# Patient Record
Sex: MALE | Race: WHITE | NOT HISPANIC OR LATINO | Employment: OTHER | URBAN - METROPOLITAN AREA
[De-identification: names, ages, dates, MRNs, and addresses within clinical notes are randomized per-mention and may not be internally consistent; named-entity substitution may affect disease eponyms.]

---

## 2017-07-10 ENCOUNTER — HISTORICAL (OUTPATIENT)
Dept: LAB | Facility: HOSPITAL | Age: 72
End: 2017-07-10

## 2017-07-12 LAB — FINAL CULTURE: NORMAL

## 2018-02-26 ENCOUNTER — HISTORICAL (OUTPATIENT)
Dept: LAB | Facility: HOSPITAL | Age: 73
End: 2018-02-26

## 2018-02-26 LAB — GROUP & RH: NORMAL

## 2018-05-23 ENCOUNTER — HISTORICAL (OUTPATIENT)
Dept: ADMINISTRATIVE | Facility: HOSPITAL | Age: 73
End: 2018-05-23

## 2018-05-23 LAB
ABS NEUT (OLG): 6.4
APPEARANCE, UA: CLEAR
BACTERIA #/AREA URNS AUTO: ABNORMAL /HPF
BILIRUB UR QL STRIP: NEGATIVE MG/DL
COLOR UR: YELLOW
ERYTHROCYTE [DISTWIDTH] IN BLOOD BY AUTOMATED COUNT: 12.8 % (ref 11.5–17)
GLUCOSE (UA): NEGATIVE MG/DL
HCT VFR BLD AUTO: 40.8 % (ref 42–52)
HGB BLD-MCNC: 13.4 GM/DL (ref 14–18)
HGB UR QL STRIP: NEGATIVE UNIT/L
KETONES UR QL STRIP: NEGATIVE MG/DL
LEUKOCYTE ESTERASE UR QL STRIP: ABNORMAL UNIT/L
LYMPHOCYTES # BLD AUTO: 1.8 X10(3)/MCL (ref 0.6–3.4)
LYMPHOCYTES NFR BLD AUTO: 19.8 % (ref 13–40)
MCH RBC QN AUTO: 31.2 PG (ref 27–31.2)
MCHC RBC AUTO-ENTMCNC: 33 GM/DL (ref 32–36)
MCV RBC AUTO: 95 FL (ref 80–94)
MONOCYTES # BLD AUTO: 0.9 X10(3)/MCL (ref 0–1.8)
MONOCYTES NFR BLD AUTO: 9.4 % (ref 0.1–24)
NEUTROPHILS NFR BLD AUTO: 70.8 % (ref 47–80)
NITRITE UR QL STRIP.AUTO: NEGATIVE
PH UR STRIP: 6 [PH]
PLATELET # BLD AUTO: 234 X10(3)/MCL (ref 130–400)
PMV BLD AUTO: 8.2 FL
PROT UR QL STRIP: NEGATIVE MG/DL
RBC # BLD AUTO: 4.29 X10(6)/MCL (ref 4.7–6.1)
RBC #/AREA URNS HPF: ABNORMAL /HPF
SP GR UR STRIP: >1.03
SQUAMOUS EPITHELIAL, UA: ABNORMAL /LPF
UROBILINOGEN UR STRIP-ACNC: 0.2 MG/DL
WBC # SPEC AUTO: 9.1 X10(3)/MCL (ref 4.5–11.5)
WBC #/AREA URNS AUTO: ABNORMAL /[HPF]

## 2018-07-29 ENCOUNTER — HOSPITAL ENCOUNTER (OUTPATIENT)
Dept: MEDSURG UNIT | Facility: HOSPITAL | Age: 73
End: 2018-07-31
Attending: SURGERY | Admitting: SURGERY

## 2018-07-29 LAB
ABS NEUT (OLG): 10.77 X10(3)/MCL (ref 2.1–9.2)
ALBUMIN SERPL-MCNC: 3.7 GM/DL (ref 3.4–5)
ALBUMIN/GLOB SERPL: 1.1 RATIO (ref 1.1–2)
ALP SERPL-CCNC: 66 UNIT/L (ref 50–136)
ALT SERPL-CCNC: 20 UNIT/L (ref 12–78)
APPEARANCE, UA: CLEAR
APPEARANCE, UA: NORMAL
AST SERPL-CCNC: 9 UNIT/L (ref 15–37)
BACTERIA SPEC CULT: ABNORMAL /HPF
BACTERIA SPEC CULT: NORMAL /HPF
BASOPHILS # BLD AUTO: 0 X10(3)/MCL (ref 0–0.2)
BASOPHILS NFR BLD AUTO: 0 %
BILIRUB SERPL-MCNC: 0.6 MG/DL (ref 0.2–1)
BILIRUB UR QL STRIP: ABNORMAL
BILIRUB UR QL STRIP: NORMAL
BILIRUBIN DIRECT+TOT PNL SERPL-MCNC: 0.2 MG/DL (ref 0–0.5)
BILIRUBIN DIRECT+TOT PNL SERPL-MCNC: 0.4 MG/DL (ref 0–0.8)
BUN SERPL-MCNC: 26 MG/DL (ref 7–18)
CALCIUM SERPL-MCNC: 8.9 MG/DL (ref 8.5–10.1)
CHLORIDE SERPL-SCNC: 103 MMOL/L (ref 98–107)
CO2 SERPL-SCNC: 31 MMOL/L (ref 21–32)
COLOR UR: ABNORMAL
COLOR UR: NORMAL
CREAT SERPL-MCNC: 1.42 MG/DL (ref 0.7–1.3)
EOSINOPHIL # BLD AUTO: 0.1 X10(3)/MCL (ref 0–0.9)
EOSINOPHIL NFR BLD AUTO: 1 %
ERYTHROCYTE [DISTWIDTH] IN BLOOD BY AUTOMATED COUNT: 12.7 % (ref 11.5–17)
GLOBULIN SER-MCNC: 3.4 GM/DL (ref 2.4–3.5)
GLUCOSE (UA): NEGATIVE
GLUCOSE (UA): NORMAL
GLUCOSE SERPL-MCNC: 116 MG/DL (ref 74–106)
HCT VFR BLD AUTO: 43 % (ref 42–52)
HGB BLD-MCNC: 14 GM/DL (ref 14–18)
HGB UR QL STRIP: NEGATIVE
HGB UR QL STRIP: NORMAL
KETONES UR QL STRIP: ABNORMAL
KETONES UR QL STRIP: NORMAL
LEUKOCYTE ESTERASE UR QL STRIP: ABNORMAL
LEUKOCYTE ESTERASE UR QL STRIP: NORMAL
LYMPHOCYTES # BLD AUTO: 1.9 X10(3)/MCL (ref 0.6–4.6)
LYMPHOCYTES NFR BLD AUTO: 13 %
MCH RBC QN AUTO: 31 PG (ref 27–31)
MCHC RBC AUTO-ENTMCNC: 32.6 GM/DL (ref 33–36)
MCV RBC AUTO: 95.3 FL (ref 80–94)
MONOCYTES # BLD AUTO: 1.5 X10(3)/MCL (ref 0.1–1.3)
MONOCYTES NFR BLD AUTO: 10 %
NEUTROPHILS # BLD AUTO: 10.77 X10(3)/MCL (ref 1.4–7.9)
NEUTROPHILS NFR BLD AUTO: 75 %
NITRITE UR QL STRIP: NEGATIVE
NITRITE UR QL STRIP: NORMAL
PH UR STRIP: 5.5 [PH] (ref 5–9)
PH UR STRIP: NORMAL [PH] (ref 5–9)
PLATELET # BLD AUTO: 218 X10(3)/MCL (ref 130–400)
PMV BLD AUTO: 8.8 FL (ref 9.4–12.4)
POTASSIUM SERPL-SCNC: 4.1 MMOL/L (ref 3.5–5.1)
PROT SERPL-MCNC: 7.1 GM/DL (ref 6.4–8.2)
PROT UR QL STRIP: ABNORMAL
PROT UR QL STRIP: NORMAL
RBC # BLD AUTO: 4.51 X10(6)/MCL (ref 4.7–6.1)
RBC #/AREA URNS HPF: ABNORMAL /[HPF]
RBC #/AREA URNS HPF: NORMAL /HPF
SODIUM SERPL-SCNC: 140 MMOL/L (ref 136–145)
SP GR UR STRIP: 1.03 (ref 1–1.03)
SP GR UR STRIP: NORMAL (ref 1–1.03)
SQUAMOUS EPITHELIAL, UA: ABNORMAL
SQUAMOUS EPITHELIAL, UA: NORMAL /HPF
UROBILINOGEN UR STRIP-ACNC: 1
UROBILINOGEN UR STRIP-ACNC: NORMAL
WBC # SPEC AUTO: 14.3 X10(3)/MCL (ref 4.5–11.5)
WBC #/AREA URNS HPF: ABNORMAL /HPF
WBC #/AREA URNS HPF: NORMAL /HPF

## 2018-07-30 LAB
ABS NEUT (OLG): 9.71 X10(3)/MCL (ref 2.1–9.2)
ALBUMIN SERPL-MCNC: 3.1 GM/DL (ref 3.4–5)
ALBUMIN/GLOB SERPL: 1.1 {RATIO}
ALP SERPL-CCNC: 58 UNIT/L (ref 50–136)
ALT SERPL-CCNC: 18 UNIT/L (ref 12–78)
AST SERPL-CCNC: 15 UNIT/L (ref 15–37)
BASOPHILS # BLD AUTO: 0 X10(3)/MCL (ref 0–0.2)
BASOPHILS NFR BLD AUTO: 0 %
BILIRUB SERPL-MCNC: 1.2 MG/DL (ref 0.2–1)
BILIRUBIN DIRECT+TOT PNL SERPL-MCNC: 0.6 MG/DL (ref 0–0.2)
BILIRUBIN DIRECT+TOT PNL SERPL-MCNC: 0.6 MG/DL (ref 0–0.8)
BUN SERPL-MCNC: 21 MG/DL (ref 7–18)
CALCIUM SERPL-MCNC: 8.2 MG/DL (ref 8.5–10.1)
CHLORIDE SERPL-SCNC: 105 MMOL/L (ref 98–107)
CO2 SERPL-SCNC: 27 MMOL/L (ref 21–32)
CREAT SERPL-MCNC: 0.84 MG/DL (ref 0.7–1.3)
EOSINOPHIL # BLD AUTO: 0.1 X10(3)/MCL (ref 0–0.9)
EOSINOPHIL NFR BLD AUTO: 0 %
ERYTHROCYTE [DISTWIDTH] IN BLOOD BY AUTOMATED COUNT: 12.5 % (ref 11.5–17)
GLOBULIN SER-MCNC: 2.8 GM/DL (ref 2.4–3.5)
GLUCOSE SERPL-MCNC: 145 MG/DL (ref 74–106)
HCT VFR BLD AUTO: 38.6 % (ref 42–52)
HGB BLD-MCNC: 12.4 GM/DL (ref 14–18)
LYMPHOCYTES # BLD AUTO: 1.1 X10(3)/MCL (ref 0.6–4.6)
LYMPHOCYTES NFR BLD AUTO: 9 %
MCH RBC QN AUTO: 30.2 PG (ref 27–31)
MCHC RBC AUTO-ENTMCNC: 32.1 GM/DL (ref 33–36)
MCV RBC AUTO: 94.1 FL (ref 80–94)
MONOCYTES # BLD AUTO: 0.5 X10(3)/MCL (ref 0.1–1.3)
MONOCYTES NFR BLD AUTO: 4 %
NEUTROPHILS # BLD AUTO: 9.71 X10(3)/MCL (ref 2.1–9.2)
NEUTROPHILS NFR BLD AUTO: 85 %
PLATELET # BLD AUTO: 188 X10(3)/MCL (ref 130–400)
PMV BLD AUTO: 9.2 FL (ref 9.4–12.4)
POTASSIUM SERPL-SCNC: 3.9 MMOL/L (ref 3.5–5.1)
PROT SERPL-MCNC: 5.9 GM/DL (ref 6.4–8.2)
RBC # BLD AUTO: 4.1 X10(6)/MCL (ref 4.7–6.1)
SODIUM SERPL-SCNC: 142 MMOL/L (ref 136–145)
WBC # SPEC AUTO: 11.4 X10(3)/MCL (ref 4.5–11.5)

## 2018-07-31 LAB
ABS NEUT (OLG): 8.17 X10(3)/MCL (ref 2.1–9.2)
ALBUMIN SERPL-MCNC: 3 GM/DL (ref 3.4–5)
ALBUMIN/GLOB SERPL: 1 RATIO (ref 1.1–2)
ALP SERPL-CCNC: 105 UNIT/L (ref 50–136)
ALT SERPL-CCNC: 147 UNIT/L (ref 12–78)
AST SERPL-CCNC: 94 UNIT/L (ref 15–37)
BASOPHILS # BLD AUTO: 0 X10(3)/MCL (ref 0–0.2)
BASOPHILS NFR BLD AUTO: 0 %
BILIRUB SERPL-MCNC: 0.8 MG/DL (ref 0.2–1)
BILIRUBIN DIRECT+TOT PNL SERPL-MCNC: 0.3 MG/DL (ref 0–0.5)
BILIRUBIN DIRECT+TOT PNL SERPL-MCNC: 0.5 MG/DL (ref 0–0.8)
BUN SERPL-MCNC: 18 MG/DL (ref 7–18)
CALCIUM SERPL-MCNC: 8.7 MG/DL (ref 8.5–10.1)
CHLORIDE SERPL-SCNC: 107 MMOL/L (ref 98–107)
CO2 SERPL-SCNC: 30 MMOL/L (ref 21–32)
CREAT SERPL-MCNC: 1.05 MG/DL (ref 0.7–1.3)
EOSINOPHIL # BLD AUTO: 0.1 X10(3)/MCL (ref 0–0.9)
EOSINOPHIL NFR BLD AUTO: 1 %
ERYTHROCYTE [DISTWIDTH] IN BLOOD BY AUTOMATED COUNT: 12.4 % (ref 11.5–17)
GLOBULIN SER-MCNC: 3 GM/DL (ref 2.4–3.5)
GLUCOSE SERPL-MCNC: 109 MG/DL (ref 74–106)
HCT VFR BLD AUTO: 38.2 % (ref 42–52)
HGB BLD-MCNC: 12.3 GM/DL (ref 14–18)
LYMPHOCYTES # BLD AUTO: 2.3 X10(3)/MCL (ref 0.6–4.6)
LYMPHOCYTES NFR BLD AUTO: 20 %
MCH RBC QN AUTO: 30.4 PG (ref 27–31)
MCHC RBC AUTO-ENTMCNC: 32.2 GM/DL (ref 33–36)
MCV RBC AUTO: 94.6 FL (ref 80–94)
MONOCYTES # BLD AUTO: 1 X10(3)/MCL (ref 0.1–1.3)
MONOCYTES NFR BLD AUTO: 8 %
NEUTROPHILS # BLD AUTO: 8.17 X10(3)/MCL (ref 2.1–9.2)
NEUTROPHILS NFR BLD AUTO: 70 %
PLATELET # BLD AUTO: 217 X10(3)/MCL (ref 130–400)
PMV BLD AUTO: 8.7 FL (ref 9.4–12.4)
POTASSIUM SERPL-SCNC: 4.2 MMOL/L (ref 3.5–5.1)
PROT SERPL-MCNC: 6 GM/DL (ref 6.4–8.2)
RBC # BLD AUTO: 4.04 X10(6)/MCL (ref 4.7–6.1)
SODIUM SERPL-SCNC: 142 MMOL/L (ref 136–145)
WBC # SPEC AUTO: 11.6 X10(3)/MCL (ref 4.5–11.5)

## 2018-08-27 ENCOUNTER — HISTORICAL (OUTPATIENT)
Dept: ADMINISTRATIVE | Facility: HOSPITAL | Age: 73
End: 2018-08-27

## 2018-08-27 LAB
ALBUMIN SERPL-MCNC: 4.6 GM/DL (ref 3.4–5)
ALBUMIN/GLOB SERPL: 1.84 {RATIO} (ref 1.5–2.5)
ALP SERPL-CCNC: 50 UNIT/L (ref 38–126)
ALT SERPL-CCNC: 11 UNIT/L (ref 7–52)
AST SERPL-CCNC: 13 UNIT/L (ref 15–37)
BILIRUB SERPL-MCNC: 0.6 MG/DL (ref 0.2–1)
BILIRUBIN DIRECT+TOT PNL SERPL-MCNC: 0.2 MG/DL (ref 0–0.5)
BILIRUBIN DIRECT+TOT PNL SERPL-MCNC: 0.4 MG/DL
BUN SERPL-MCNC: 23 MG/DL (ref 7–18)
CALCIUM SERPL-MCNC: 9.1 MG/DL (ref 8.5–10)
CHLORIDE SERPL-SCNC: 106 MMOL/L (ref 98–107)
CO2 SERPL-SCNC: 28 MMOL/L (ref 21–32)
CREAT SERPL-MCNC: 1.04 MG/DL (ref 0.6–1.3)
EST. AVERAGE GLUCOSE BLD GHB EST-MCNC: 126 MG/DL
GLOBULIN SER-MCNC: 2.5 GM/DL (ref 1.2–3)
GLUCOSE SERPL-MCNC: 66 MG/DL (ref 74–106)
HBA1C MFR BLD: 6 % (ref 4.4–6.4)
POTASSIUM SERPL-SCNC: 4 MMOL/L (ref 3.5–5.1)
PROT SERPL-MCNC: 7.1 GM/DL (ref 6.4–8.2)
SODIUM SERPL-SCNC: 142 MMOL/L (ref 136–145)

## 2019-02-27 ENCOUNTER — HISTORICAL (OUTPATIENT)
Dept: ADMINISTRATIVE | Facility: HOSPITAL | Age: 74
End: 2019-02-27

## 2019-02-27 LAB
ABS NEUT (OLG): 4.5 X10(3)/MCL (ref 2.1–9.2)
ALBUMIN SERPL-MCNC: 4.5 GM/DL (ref 3.4–5)
ALBUMIN/GLOB SERPL: 1.5 {RATIO} (ref 1.5–2.5)
ALP SERPL-CCNC: 60 UNIT/L (ref 38–126)
ALT SERPL-CCNC: 14 UNIT/L (ref 7–52)
APPEARANCE, UA: CLEAR
AST SERPL-CCNC: 15 UNIT/L (ref 15–37)
BACTERIA #/AREA URNS AUTO: ABNORMAL /HPF
BILIRUB SERPL-MCNC: 0.8 MG/DL (ref 0.2–1)
BILIRUB UR QL STRIP: NEGATIVE MG/DL
BILIRUBIN DIRECT+TOT PNL SERPL-MCNC: 0.2 MG/DL (ref 0–0.5)
BILIRUBIN DIRECT+TOT PNL SERPL-MCNC: 0.6 MG/DL
BUN SERPL-MCNC: 23 MG/DL (ref 7–18)
CALCIUM SERPL-MCNC: 8.8 MG/DL (ref 8.5–10)
CHLORIDE SERPL-SCNC: 101 MMOL/L (ref 98–107)
CHOLEST SERPL-MCNC: 137 MG/DL (ref 0–200)
CHOLEST/HDLC SERPL: 3 {RATIO}
CO2 SERPL-SCNC: 32 MMOL/L (ref 21–32)
COLOR UR: ABNORMAL
CREAT SERPL-MCNC: 0.98 MG/DL (ref 0.6–1.3)
ERYTHROCYTE [DISTWIDTH] IN BLOOD BY AUTOMATED COUNT: 13.2 % (ref 11.5–17)
EST. AVERAGE GLUCOSE BLD GHB EST-MCNC: 146 MG/DL
GLOBULIN SER-MCNC: 3 GM/DL (ref 1.2–3)
GLUCOSE (UA): NEGATIVE MG/DL
GLUCOSE SERPL-MCNC: 137 MG/DL (ref 74–106)
HBA1C MFR BLD: 6.7 % (ref 4.4–6.4)
HCT VFR BLD AUTO: 44.9 % (ref 42–52)
HDLC SERPL-MCNC: 46 MG/DL (ref 35–60)
HGB BLD-MCNC: 13.7 GM/DL (ref 14–18)
HGB UR QL STRIP: ABNORMAL UNIT/L
KETONES UR QL STRIP: NEGATIVE MG/DL
LDLC SERPL CALC-MCNC: 65 MG/DL (ref 0–129)
LEUKOCYTE ESTERASE UR QL STRIP: NEGATIVE UNIT/L
LYMPHOCYTES # BLD AUTO: 2.2 X10(3)/MCL (ref 0.6–3.4)
LYMPHOCYTES NFR BLD AUTO: 29.8 % (ref 13–40)
MCH RBC QN AUTO: 28.1 PG (ref 27–31.2)
MCHC RBC AUTO-ENTMCNC: 30 GM/DL (ref 32–36)
MCV RBC AUTO: 92 FL (ref 80–94)
MONOCYTES # BLD AUTO: 0.6 X10(3)/MCL (ref 0.1–1.3)
MONOCYTES NFR BLD AUTO: 8.2 % (ref 0.1–24)
NEUTROPHILS NFR BLD AUTO: 62 % (ref 47–80)
NITRITE UR QL STRIP.AUTO: NEGATIVE
PH UR STRIP: 6.5 [PH]
PLATELET # BLD AUTO: 247 X10(3)/MCL (ref 130–400)
PMV BLD AUTO: 8.3 FL (ref 9.4–12.4)
POTASSIUM SERPL-SCNC: 4.4 MMOL/L (ref 3.5–5.1)
PROT SERPL-MCNC: 7.5 GM/DL (ref 6.4–8.2)
PROT UR QL STRIP: NEGATIVE MG/DL
RBC # BLD AUTO: 4.87 X10(6)/MCL (ref 4.7–6.1)
RBC #/AREA URNS HPF: ABNORMAL /HPF
SODIUM SERPL-SCNC: 138 MMOL/L (ref 136–145)
SP GR UR STRIP: 1.02
SQUAMOUS EPITHELIAL, UA: ABNORMAL /LPF
TRIGL SERPL-MCNC: 84 MG/DL (ref 30–150)
UROBILINOGEN UR STRIP-ACNC: 0.2 MG/DL
VLDLC SERPL CALC-MCNC: 16.8 MG/DL
WBC # SPEC AUTO: 7.3 X10(3)/MCL (ref 4.5–11.5)
WBC #/AREA URNS AUTO: ABNORMAL /[HPF]

## 2019-03-28 ENCOUNTER — HISTORICAL (OUTPATIENT)
Dept: ADMINISTRATIVE | Facility: HOSPITAL | Age: 74
End: 2019-03-28

## 2019-03-28 LAB — POC CREATININE: 0.9 MG/DL (ref 0.6–1.3)

## 2019-04-09 ENCOUNTER — HISTORICAL (OUTPATIENT)
Dept: ADMINISTRATIVE | Facility: HOSPITAL | Age: 74
End: 2019-04-09

## 2019-04-09 LAB — POC CREATININE: 1 MG/DL (ref 0.6–1.3)

## 2019-06-17 ENCOUNTER — HISTORICAL (OUTPATIENT)
Dept: LAB | Facility: HOSPITAL | Age: 74
End: 2019-06-17

## 2019-06-17 LAB
COLOR STL: NORMAL
CONSISTENCY STL: NORMAL
HEMOCCULT SP1 STL QL: NEGATIVE

## 2019-06-18 ENCOUNTER — HISTORICAL (OUTPATIENT)
Dept: ADMINISTRATIVE | Facility: HOSPITAL | Age: 74
End: 2019-06-18

## 2019-06-18 LAB
ABS NEUT (OLG): 4.26 X10(3)/MCL (ref 2.1–9.2)
ALBUMIN SERPL-MCNC: 4.2 GM/DL (ref 3.4–5)
ALBUMIN/GLOB SERPL: 1.4 {RATIO}
ALP SERPL-CCNC: 68 UNIT/L (ref 50–136)
ALT SERPL-CCNC: 20 UNIT/L (ref 12–78)
AMYLASE SERPL-CCNC: 55 UNIT/L (ref 25–115)
AST SERPL-CCNC: 12 UNIT/L (ref 15–37)
BASOPHILS # BLD AUTO: 0 X10(3)/MCL (ref 0–0.2)
BASOPHILS NFR BLD AUTO: 1 %
BILIRUB SERPL-MCNC: 0.8 MG/DL (ref 0.2–1)
BILIRUBIN DIRECT+TOT PNL SERPL-MCNC: 0.2 MG/DL (ref 0–0.2)
BILIRUBIN DIRECT+TOT PNL SERPL-MCNC: 0.6 MG/DL (ref 0–0.8)
BUN SERPL-MCNC: 29 MG/DL (ref 7–18)
CALCIUM SERPL-MCNC: 9.4 MG/DL (ref 8.5–10.1)
CHLORIDE SERPL-SCNC: 105 MMOL/L (ref 98–107)
CO2 SERPL-SCNC: 30 MMOL/L (ref 21–32)
CREAT SERPL-MCNC: 1.08 MG/DL (ref 0.7–1.3)
EOSINOPHIL # BLD AUTO: 0.1 X10(3)/MCL (ref 0–0.9)
EOSINOPHIL NFR BLD AUTO: 1 %
ERYTHROCYTE [DISTWIDTH] IN BLOOD BY AUTOMATED COUNT: 12.3 % (ref 11.5–17)
GLOBULIN SER-MCNC: 3 GM/DL (ref 2.4–3.5)
GLUCOSE SERPL-MCNC: 117 MG/DL (ref 74–106)
HCT VFR BLD AUTO: 42.2 % (ref 42–52)
HGB BLD-MCNC: 13.4 GM/DL (ref 14–18)
LIPASE SERPL-CCNC: 99 UNIT/L (ref 73–393)
LYMPHOCYTES # BLD AUTO: 1.4 X10(3)/MCL (ref 0.6–4.6)
LYMPHOCYTES NFR BLD AUTO: 22 %
MCH RBC QN AUTO: 29.4 PG (ref 27–31)
MCHC RBC AUTO-ENTMCNC: 31.8 GM/DL (ref 33–36)
MCV RBC AUTO: 92.5 FL (ref 80–94)
MONOCYTES # BLD AUTO: 0.5 X10(3)/MCL (ref 0.1–1.3)
MONOCYTES NFR BLD AUTO: 9 %
NEUTROPHILS # BLD AUTO: 4.26 X10(3)/MCL (ref 2.1–9.2)
NEUTROPHILS NFR BLD AUTO: 68 %
PLATELET # BLD AUTO: 225 X10(3)/MCL (ref 130–400)
PMV BLD AUTO: 9.2 FL (ref 9.4–12.4)
POTASSIUM SERPL-SCNC: 4.4 MMOL/L (ref 3.5–5.1)
PROT SERPL-MCNC: 7.2 GM/DL (ref 6.4–8.2)
RBC # BLD AUTO: 4.56 X10(6)/MCL (ref 4.7–6.1)
SODIUM SERPL-SCNC: 139 MMOL/L (ref 136–145)
WBC # SPEC AUTO: 6.3 X10(3)/MCL (ref 4.5–11.5)

## 2019-06-26 ENCOUNTER — HISTORICAL (OUTPATIENT)
Dept: ADMINISTRATIVE | Facility: HOSPITAL | Age: 74
End: 2019-06-26

## 2019-08-05 ENCOUNTER — HISTORICAL (OUTPATIENT)
Dept: ANESTHESIOLOGY | Facility: HOSPITAL | Age: 74
End: 2019-08-05

## 2019-08-28 LAB — CRC RECOMMENDATION EXT: NORMAL

## 2020-08-28 ENCOUNTER — HISTORICAL (OUTPATIENT)
Dept: ADMINISTRATIVE | Facility: HOSPITAL | Age: 75
End: 2020-08-28

## 2020-08-28 LAB
ALBUMIN SERPL-MCNC: 4.6 GM/DL (ref 3.4–5)
ALBUMIN/GLOB SERPL: 1.77 {RATIO} (ref 1.5–2.5)
ALP SERPL-CCNC: 52 UNIT/L (ref 38–126)
ALT SERPL-CCNC: 10 UNIT/L (ref 7–52)
AST SERPL-CCNC: 13 UNIT/L (ref 15–37)
BILIRUB SERPL-MCNC: 0.7 MG/DL (ref 0.2–1)
BILIRUBIN DIRECT+TOT PNL SERPL-MCNC: 0.2 MG/DL (ref 0–0.5)
BILIRUBIN DIRECT+TOT PNL SERPL-MCNC: 0.5 MG/DL
BUN SERPL-MCNC: 21 MG/DL (ref 7–18)
CALCIUM SERPL-MCNC: 9.5 MG/DL (ref 8.5–10.1)
CHLORIDE SERPL-SCNC: 101 MMOL/L (ref 98–107)
CHOLEST SERPL-MCNC: 123 MG/DL (ref 0–200)
CHOLEST/HDLC SERPL: 2.6 {RATIO}
CO2 SERPL-SCNC: 30 MMOL/L (ref 21–32)
CREAT SERPL-MCNC: 0.97 MG/DL (ref 0.6–1.3)
EST. AVERAGE GLUCOSE BLD GHB EST-MCNC: 134 MG/DL
GLOBULIN SER-MCNC: 2.6 GM/DL (ref 1.2–3)
GLUCOSE SERPL-MCNC: 101 MG/DL (ref 74–106)
HBA1C MFR BLD: 6.3 % (ref 4.4–6.4)
HDLC SERPL-MCNC: 47 MG/DL (ref 35–60)
LDLC SERPL CALC-MCNC: 68 MG/DL (ref 0–129)
POTASSIUM SERPL-SCNC: 4.3 MMOL/L (ref 3.5–5.1)
PROT SERPL-MCNC: 7.2 GM/DL (ref 6.4–8.2)
SODIUM SERPL-SCNC: 139 MMOL/L (ref 136–145)
TRIGL SERPL-MCNC: 92 MG/DL (ref 30–150)
VLDLC SERPL CALC-MCNC: 18.4 MG/DL

## 2021-02-12 ENCOUNTER — OFFICE VISIT (OUTPATIENT)
Dept: NEUROSURGERY | Facility: CLINIC | Age: 76
End: 2021-02-12
Payer: OTHER GOVERNMENT

## 2021-02-12 ENCOUNTER — TELEPHONE (OUTPATIENT)
Dept: NEUROSURGERY | Facility: CLINIC | Age: 76
End: 2021-02-12

## 2021-02-12 DIAGNOSIS — M51.36 DEGENERATIVE DISC DISEASE, LUMBAR: Primary | ICD-10-CM

## 2021-02-12 DIAGNOSIS — M54.50 LUMBAR PAIN: Primary | ICD-10-CM

## 2021-02-12 DIAGNOSIS — M54.16 LUMBAR RADICULOPATHY: ICD-10-CM

## 2021-02-12 PROCEDURE — 99443 PR PHYSICIAN TELEPHONE EVALUATION 21-30 MIN: ICD-10-PCS | Mod: 95,,, | Performed by: NEUROLOGICAL SURGERY

## 2021-02-12 PROCEDURE — 99443 PR PHYSICIAN TELEPHONE EVALUATION 21-30 MIN: CPT | Mod: 95,,, | Performed by: NEUROLOGICAL SURGERY

## 2021-02-18 ENCOUNTER — TELEPHONE (OUTPATIENT)
Dept: PAIN MEDICINE | Facility: CLINIC | Age: 76
End: 2021-02-18

## 2021-02-18 DIAGNOSIS — M54.16 LUMBAR RADICULOPATHY: Primary | ICD-10-CM

## 2021-02-25 ENCOUNTER — TELEPHONE (OUTPATIENT)
Dept: NEUROSURGERY | Facility: CLINIC | Age: 76
End: 2021-02-25

## 2021-02-26 ENCOUNTER — TELEPHONE (OUTPATIENT)
Dept: NEUROSURGERY | Facility: CLINIC | Age: 76
End: 2021-02-26

## 2021-03-02 ENCOUNTER — TELEPHONE (OUTPATIENT)
Dept: NEUROSURGERY | Facility: CLINIC | Age: 76
End: 2021-03-02

## 2021-03-03 ENCOUNTER — HISTORICAL (OUTPATIENT)
Dept: ADMINISTRATIVE | Facility: HOSPITAL | Age: 76
End: 2021-03-03

## 2021-03-03 ENCOUNTER — TELEPHONE (OUTPATIENT)
Dept: NEUROSURGERY | Facility: CLINIC | Age: 76
End: 2021-03-03

## 2021-03-03 LAB
ABS NEUT (OLG): 4.4 X10(3)/MCL (ref 2.1–9.2)
ALBUMIN SERPL-MCNC: 4.6 GM/DL (ref 3.4–5)
ALBUMIN/GLOB SERPL: 1.59 {RATIO} (ref 1.5–2.5)
ALP SERPL-CCNC: 60 UNIT/L (ref 38–126)
ALT SERPL-CCNC: 13 UNIT/L (ref 7–52)
APPEARANCE, UA: CLEAR
AST SERPL-CCNC: 15 UNIT/L (ref 15–37)
BACTERIA #/AREA URNS AUTO: ABNORMAL /HPF
BILIRUB SERPL-MCNC: 0.8 MG/DL (ref 0.2–1)
BILIRUB UR QL STRIP: NEGATIVE MG/DL
BILIRUBIN DIRECT+TOT PNL SERPL-MCNC: 0.2 MG/DL (ref 0–0.5)
BILIRUBIN DIRECT+TOT PNL SERPL-MCNC: 0.6 MG/DL
BUN SERPL-MCNC: 19 MG/DL (ref 7–18)
CALCIUM SERPL-MCNC: 9.8 MG/DL (ref 8.5–10.1)
CHLORIDE SERPL-SCNC: 102 MMOL/L (ref 98–107)
CHOLEST SERPL-MCNC: 119 MG/DL (ref 0–200)
CHOLEST/HDLC SERPL: 2.9 {RATIO}
CO2 SERPL-SCNC: 31 MMOL/L (ref 21–32)
COLOR UR: YELLOW
CREAT SERPL-MCNC: 1.05 MG/DL (ref 0.6–1.3)
ERYTHROCYTE [DISTWIDTH] IN BLOOD BY AUTOMATED COUNT: 12.3 % (ref 11.5–17)
EST. AVERAGE GLUCOSE BLD GHB EST-MCNC: 134 MG/DL
GLOBULIN SER-MCNC: 2.9 GM/DL (ref 1.2–3)
GLUCOSE (UA): NEGATIVE MG/DL
GLUCOSE SERPL-MCNC: 104 MG/DL (ref 74–106)
HBA1C MFR BLD: 6.3 % (ref 4.4–6.4)
HCT VFR BLD AUTO: 40.7 % (ref 42–52)
HDLC SERPL-MCNC: 41 MG/DL (ref 35–60)
HGB BLD-MCNC: 13.4 GM/DL (ref 14–18)
HGB UR QL STRIP: ABNORMAL UNIT/L
KETONES UR QL STRIP: NEGATIVE MG/DL
LDLC SERPL CALC-MCNC: 67 MG/DL (ref 0–129)
LEUKOCYTE ESTERASE UR QL STRIP: NEGATIVE UNIT/L
LYMPHOCYTES # BLD AUTO: 2 X10(3)/MCL (ref 0.6–3.4)
LYMPHOCYTES NFR BLD AUTO: 29.2 % (ref 13–40)
MCH RBC QN AUTO: 30.2 PG (ref 27–31.2)
MCHC RBC AUTO-ENTMCNC: 33 GM/DL (ref 32–36)
MCV RBC AUTO: 92 FL (ref 80–94)
MONOCYTES # BLD AUTO: 0.6 X10(3)/MCL (ref 0.1–1.3)
MONOCYTES NFR BLD AUTO: 8.4 % (ref 0.1–24)
NEUTROPHILS NFR BLD AUTO: 62.4 % (ref 47–80)
NITRITE UR QL STRIP.AUTO: NEGATIVE
PH UR STRIP: 6 [PH]
PLATELET # BLD AUTO: 252 X10(3)/MCL (ref 130–400)
PMV BLD AUTO: 8.1 FL (ref 9.4–12.4)
POTASSIUM SERPL-SCNC: 4.2 MMOL/L (ref 3.5–5.1)
PROT SERPL-MCNC: 7.5 GM/DL (ref 6.4–8.2)
PROT UR QL STRIP: NEGATIVE MG/DL
PSA SERPL-MCNC: 1.66 NG/ML (ref 0–6.5)
RBC # BLD AUTO: 4.44 X10(6)/MCL (ref 4.7–6.1)
RBC #/AREA URNS HPF: ABNORMAL /HPF
SODIUM SERPL-SCNC: 140 MMOL/L (ref 136–145)
SP GR UR STRIP: 1.02
SQUAMOUS EPITHELIAL, UA: ABNORMAL /LPF
TRIGL SERPL-MCNC: 95 MG/DL (ref 30–150)
UROBILINOGEN UR STRIP-ACNC: 0.2 MG/DL
VLDLC SERPL CALC-MCNC: 19 MG/DL
WBC # SPEC AUTO: 7 X10(3)/MCL (ref 4.5–11.5)
WBC #/AREA URNS AUTO: ABNORMAL /[HPF]

## 2021-03-10 ENCOUNTER — HOSPITAL ENCOUNTER (OUTPATIENT)
Facility: HOSPITAL | Age: 76
Discharge: HOME | End: 2021-03-10
Attending: ANESTHESIOLOGY | Admitting: ANESTHESIOLOGY
Payer: OTHER GOVERNMENT

## 2021-03-10 VITALS
OXYGEN SATURATION: 97 % | HEIGHT: 69 IN | TEMPERATURE: 98 F | SYSTOLIC BLOOD PRESSURE: 142 MMHG | RESPIRATION RATE: 14 BRPM | WEIGHT: 200.19 LBS | HEART RATE: 58 BPM | BODY MASS INDEX: 29.65 KG/M2 | DIASTOLIC BLOOD PRESSURE: 66 MMHG

## 2021-03-10 DIAGNOSIS — M54.16 LUMBAR RADICULOPATHY: Primary | ICD-10-CM

## 2021-03-10 LAB — POCT GLUCOSE: 134 MG/DL (ref 70–110)

## 2021-03-10 PROCEDURE — 64484 NJX AA&/STRD TFRM EPI L/S EA: CPT | Mod: RT,,, | Performed by: ANESTHESIOLOGY

## 2021-03-10 PROCEDURE — 64483 NJX AA&/STRD TFRM EPI L/S 1: CPT | Mod: RT,,, | Performed by: ANESTHESIOLOGY

## 2021-03-10 PROCEDURE — 64484 NJX AA&/STRD TFRM EPI L/S EA: CPT | Performed by: ANESTHESIOLOGY

## 2021-03-10 PROCEDURE — 63600175 PHARM REV CODE 636 W HCPCS: Performed by: ANESTHESIOLOGY

## 2021-03-10 PROCEDURE — 82962 GLUCOSE BLOOD TEST: CPT | Performed by: ANESTHESIOLOGY

## 2021-03-10 PROCEDURE — 64483 NJX AA&/STRD TFRM EPI L/S 1: CPT | Performed by: ANESTHESIOLOGY

## 2021-03-10 PROCEDURE — 25000003 PHARM REV CODE 250: Performed by: ANESTHESIOLOGY

## 2021-03-10 PROCEDURE — 25500020 PHARM REV CODE 255: Performed by: ANESTHESIOLOGY

## 2021-03-10 PROCEDURE — 64483 PR EPIDURAL INJ, ANES/STEROID, TRANSFORAMINAL, LUMB/SACR, SNGL LEVL: ICD-10-PCS | Mod: RT,,, | Performed by: ANESTHESIOLOGY

## 2021-03-10 PROCEDURE — 64484 PRA INJECT ANES/STEROID FORAMEN LUMBAR/SACRAL W IMG GUIDE ,EA ADD LEVEL: ICD-10-PCS | Mod: RT,,, | Performed by: ANESTHESIOLOGY

## 2021-03-10 RX ORDER — MIDAZOLAM HYDROCHLORIDE 1 MG/ML
INJECTION, SOLUTION INTRAMUSCULAR; INTRAVENOUS
Status: DISCONTINUED | OUTPATIENT
Start: 2021-03-10 | End: 2021-03-10 | Stop reason: HOSPADM

## 2021-03-10 RX ORDER — TADALAFIL 5 MG/1
5 TABLET ORAL
Status: ON HOLD | COMMUNITY
End: 2021-07-07

## 2021-03-10 RX ORDER — OMEPRAZOLE 40 MG/1
40 CAPSULE, DELAYED RELEASE ORAL DAILY
COMMUNITY

## 2021-03-10 RX ORDER — ROSUVASTATIN CALCIUM 20 MG/1
20 TABLET, COATED ORAL
COMMUNITY

## 2021-03-10 RX ORDER — LIDOCAINE HYDROCHLORIDE 10 MG/ML
INJECTION, SOLUTION EPIDURAL; INFILTRATION; INTRACAUDAL; PERINEURAL
Status: DISCONTINUED | OUTPATIENT
Start: 2021-03-10 | End: 2021-03-10 | Stop reason: HOSPADM

## 2021-03-10 RX ORDER — GABAPENTIN 300 MG/1
300 CAPSULE ORAL 2 TIMES DAILY
COMMUNITY

## 2021-03-10 RX ORDER — COLESTIPOL HYDROCHLORIDE 5 G/5G
1 GRANULE, FOR SUSPENSION ORAL DAILY
COMMUNITY
End: 2023-03-14

## 2021-03-10 RX ORDER — LISINOPRIL AND HYDROCHLOROTHIAZIDE 12.5; 2 MG/1; MG/1
1 TABLET ORAL DAILY
COMMUNITY

## 2021-03-10 RX ORDER — GLIPIZIDE 10 MG/1
10 TABLET, FILM COATED, EXTENDED RELEASE ORAL 2 TIMES DAILY
COMMUNITY

## 2021-03-10 RX ORDER — FENTANYL CITRATE 50 UG/ML
INJECTION, SOLUTION INTRAMUSCULAR; INTRAVENOUS
Status: DISCONTINUED | OUTPATIENT
Start: 2021-03-10 | End: 2021-03-10 | Stop reason: HOSPADM

## 2021-03-10 RX ORDER — DEXAMETHASONE SODIUM PHOSPHATE 100 MG/10ML
INJECTION INTRAMUSCULAR; INTRAVENOUS
Status: DISCONTINUED | OUTPATIENT
Start: 2021-03-10 | End: 2021-03-10 | Stop reason: HOSPADM

## 2021-03-19 ENCOUNTER — TELEPHONE (OUTPATIENT)
Dept: NEUROSURGERY | Facility: CLINIC | Age: 76
End: 2021-03-19

## 2021-03-23 ENCOUNTER — OFFICE VISIT (OUTPATIENT)
Dept: NEUROSURGERY | Facility: CLINIC | Age: 76
End: 2021-03-23
Payer: OTHER GOVERNMENT

## 2021-03-23 VITALS
HEART RATE: 64 BPM | WEIGHT: 198.44 LBS | BODY MASS INDEX: 29.39 KG/M2 | SYSTOLIC BLOOD PRESSURE: 113 MMHG | RESPIRATION RATE: 18 BRPM | DIASTOLIC BLOOD PRESSURE: 61 MMHG | HEIGHT: 69 IN

## 2021-03-23 DIAGNOSIS — M54.16 LUMBAR RADICULOPATHY: ICD-10-CM

## 2021-03-23 DIAGNOSIS — M51.36 DEGENERATIVE DISC DISEASE, LUMBAR: Primary | ICD-10-CM

## 2021-03-23 PROCEDURE — 99213 PR OFFICE/OUTPT VISIT, EST, LEVL III, 20-29 MIN: ICD-10-PCS | Mod: S$PBB,,, | Performed by: NEUROLOGICAL SURGERY

## 2021-03-23 PROCEDURE — 99213 OFFICE O/P EST LOW 20 MIN: CPT | Mod: PBBFAC | Performed by: NEUROLOGICAL SURGERY

## 2021-03-23 PROCEDURE — 99999 PR PBB SHADOW E&M-EST. PATIENT-LVL III: CPT | Mod: PBBFAC,,, | Performed by: NEUROLOGICAL SURGERY

## 2021-03-23 PROCEDURE — 99213 OFFICE O/P EST LOW 20 MIN: CPT | Mod: S$PBB,,, | Performed by: NEUROLOGICAL SURGERY

## 2021-03-23 PROCEDURE — 99999 PR PBB SHADOW E&M-EST. PATIENT-LVL III: ICD-10-PCS | Mod: PBBFAC,,, | Performed by: NEUROLOGICAL SURGERY

## 2021-03-23 RX ORDER — FINASTERIDE 5 MG/1
5 TABLET, FILM COATED ORAL DAILY
COMMUNITY

## 2021-05-05 ENCOUNTER — TELEPHONE (OUTPATIENT)
Dept: NEUROSURGERY | Facility: CLINIC | Age: 76
End: 2021-05-05

## 2021-05-05 DIAGNOSIS — M51.36 DEGENERATIVE DISC DISEASE, LUMBAR: Primary | ICD-10-CM

## 2021-05-28 ENCOUNTER — TELEPHONE (OUTPATIENT)
Dept: NEUROSURGERY | Facility: CLINIC | Age: 76
End: 2021-05-28

## 2021-06-01 ENCOUNTER — TELEPHONE (OUTPATIENT)
Dept: NEUROSURGERY | Facility: CLINIC | Age: 76
End: 2021-06-01

## 2021-06-15 ENCOUNTER — OFFICE VISIT (OUTPATIENT)
Dept: NEUROSURGERY | Facility: CLINIC | Age: 76
End: 2021-06-15
Payer: OTHER GOVERNMENT

## 2021-06-15 ENCOUNTER — TELEPHONE (OUTPATIENT)
Dept: NEUROSURGERY | Facility: CLINIC | Age: 76
End: 2021-06-15

## 2021-06-15 VITALS
HEIGHT: 69 IN | DIASTOLIC BLOOD PRESSURE: 78 MMHG | WEIGHT: 199.31 LBS | HEART RATE: 69 BPM | SYSTOLIC BLOOD PRESSURE: 126 MMHG | BODY MASS INDEX: 29.52 KG/M2 | RESPIRATION RATE: 16 BRPM

## 2021-06-15 DIAGNOSIS — M53.3 SACROILIAC JOINT DYSFUNCTION OF RIGHT SIDE: ICD-10-CM

## 2021-06-15 DIAGNOSIS — M51.36 DEGENERATIVE DISC DISEASE, LUMBAR: Primary | ICD-10-CM

## 2021-06-15 DIAGNOSIS — M48.062 LUMBAR STENOSIS WITH NEUROGENIC CLAUDICATION: ICD-10-CM

## 2021-06-15 PROCEDURE — 99999 PR PBB SHADOW E&M-EST. PATIENT-LVL IV: ICD-10-PCS | Mod: PBBFAC,,, | Performed by: NEUROLOGICAL SURGERY

## 2021-06-15 PROCEDURE — 99213 PR OFFICE/OUTPT VISIT, EST, LEVL III, 20-29 MIN: ICD-10-PCS | Mod: S$PBB,,, | Performed by: NEUROLOGICAL SURGERY

## 2021-06-15 PROCEDURE — 99214 OFFICE O/P EST MOD 30 MIN: CPT | Mod: PBBFAC | Performed by: NEUROLOGICAL SURGERY

## 2021-06-15 PROCEDURE — 99999 PR PBB SHADOW E&M-EST. PATIENT-LVL IV: CPT | Mod: PBBFAC,,, | Performed by: NEUROLOGICAL SURGERY

## 2021-06-15 PROCEDURE — 99213 OFFICE O/P EST LOW 20 MIN: CPT | Mod: S$PBB,,, | Performed by: NEUROLOGICAL SURGERY

## 2021-06-15 RX ORDER — TAMSULOSIN HYDROCHLORIDE 0.4 MG/1
1 CAPSULE ORAL 2 TIMES DAILY
COMMUNITY
Start: 2021-04-07 | End: 2023-03-14

## 2021-06-16 ENCOUNTER — TELEPHONE (OUTPATIENT)
Dept: PAIN MEDICINE | Facility: CLINIC | Age: 76
End: 2021-06-16

## 2021-06-17 ENCOUNTER — TELEPHONE (OUTPATIENT)
Dept: PAIN MEDICINE | Facility: CLINIC | Age: 76
End: 2021-06-17

## 2021-06-21 ENCOUNTER — TELEPHONE (OUTPATIENT)
Dept: PAIN MEDICINE | Facility: CLINIC | Age: 76
End: 2021-06-21

## 2021-06-21 ENCOUNTER — TELEPHONE (OUTPATIENT)
Dept: NEUROSURGERY | Facility: CLINIC | Age: 76
End: 2021-06-21

## 2021-06-21 DIAGNOSIS — M51.36 DEGENERATIVE DISC DISEASE, LUMBAR: Primary | ICD-10-CM

## 2021-06-23 DIAGNOSIS — M54.16 LUMBAR RADICULOPATHY: Primary | ICD-10-CM

## 2021-07-07 ENCOUNTER — HOSPITAL ENCOUNTER (OUTPATIENT)
Facility: HOSPITAL | Age: 76
Discharge: HOME | End: 2021-07-07
Attending: PHYSICAL MEDICINE & REHABILITATION | Admitting: PHYSICAL MEDICINE & REHABILITATION
Payer: OTHER GOVERNMENT

## 2021-07-07 VITALS
HEART RATE: 63 BPM | TEMPERATURE: 97 F | DIASTOLIC BLOOD PRESSURE: 75 MMHG | BODY MASS INDEX: 29.34 KG/M2 | OXYGEN SATURATION: 100 % | HEIGHT: 69 IN | SYSTOLIC BLOOD PRESSURE: 170 MMHG | RESPIRATION RATE: 18 BRPM | WEIGHT: 198.06 LBS

## 2021-07-07 DIAGNOSIS — M54.16 LUMBAR RADICULOPATHY: Primary | ICD-10-CM

## 2021-07-07 LAB — POCT GLUCOSE: 142 MG/DL (ref 70–110)

## 2021-07-07 PROCEDURE — 27096 PR INJECTION,SACROILIAC JOINT: ICD-10-PCS | Mod: 59,RT,, | Performed by: PHYSICAL MEDICINE & REHABILITATION

## 2021-07-07 PROCEDURE — 64483 NJX AA&/STRD TFRM EPI L/S 1: CPT | Mod: RT,,, | Performed by: PHYSICAL MEDICINE & REHABILITATION

## 2021-07-07 PROCEDURE — 27096 INJECT SACROILIAC JOINT: CPT | Mod: 59,RT,, | Performed by: PHYSICAL MEDICINE & REHABILITATION

## 2021-07-07 PROCEDURE — 25500020 PHARM REV CODE 255: Performed by: PHYSICAL MEDICINE & REHABILITATION

## 2021-07-07 PROCEDURE — 27096 INJECT SACROILIAC JOINT: CPT | Performed by: PHYSICAL MEDICINE & REHABILITATION

## 2021-07-07 PROCEDURE — 64483 PR EPIDURAL INJ, ANES/STEROID, TRANSFORAMINAL, LUMB/SACR, SNGL LEVL: ICD-10-PCS | Mod: RT,,, | Performed by: PHYSICAL MEDICINE & REHABILITATION

## 2021-07-07 PROCEDURE — 82962 GLUCOSE BLOOD TEST: CPT | Performed by: PHYSICAL MEDICINE & REHABILITATION

## 2021-07-07 PROCEDURE — 63600175 PHARM REV CODE 636 W HCPCS: Performed by: PHYSICAL MEDICINE & REHABILITATION

## 2021-07-07 PROCEDURE — 64483 NJX AA&/STRD TFRM EPI L/S 1: CPT | Performed by: PHYSICAL MEDICINE & REHABILITATION

## 2021-07-07 PROCEDURE — 25000003 PHARM REV CODE 250: Performed by: PHYSICAL MEDICINE & REHABILITATION

## 2021-07-07 RX ORDER — FENTANYL CITRATE 50 UG/ML
INJECTION, SOLUTION INTRAMUSCULAR; INTRAVENOUS
Status: DISCONTINUED | OUTPATIENT
Start: 2021-07-07 | End: 2021-07-07 | Stop reason: HOSPADM

## 2021-07-07 RX ORDER — BUPIVACAINE HYDROCHLORIDE 2.5 MG/ML
INJECTION, SOLUTION EPIDURAL; INFILTRATION; INTRACAUDAL
Status: DISCONTINUED | OUTPATIENT
Start: 2021-07-07 | End: 2021-07-07 | Stop reason: HOSPADM

## 2021-07-07 RX ORDER — MIDAZOLAM HYDROCHLORIDE 1 MG/ML
INJECTION, SOLUTION INTRAMUSCULAR; INTRAVENOUS
Status: DISCONTINUED | OUTPATIENT
Start: 2021-07-07 | End: 2021-07-07 | Stop reason: HOSPADM

## 2021-07-07 RX ORDER — ONDANSETRON 2 MG/ML
4 INJECTION INTRAMUSCULAR; INTRAVENOUS ONCE AS NEEDED
Status: DISCONTINUED | OUTPATIENT
Start: 2021-07-07 | End: 2021-07-07 | Stop reason: HOSPADM

## 2021-07-07 RX ORDER — METHYLPREDNISOLONE ACETATE 40 MG/ML
INJECTION, SUSPENSION INTRA-ARTICULAR; INTRALESIONAL; INTRAMUSCULAR; SOFT TISSUE
Status: DISCONTINUED | OUTPATIENT
Start: 2021-07-07 | End: 2021-07-07 | Stop reason: HOSPADM

## 2021-07-22 ENCOUNTER — HOSPITAL ENCOUNTER (OUTPATIENT)
Dept: RADIOLOGY | Facility: HOSPITAL | Age: 76
Discharge: HOME | End: 2021-07-22
Attending: PHYSICIAN ASSISTANT
Payer: OTHER GOVERNMENT

## 2021-07-22 ENCOUNTER — TELEPHONE (OUTPATIENT)
Dept: NEUROSURGERY | Facility: CLINIC | Age: 76
End: 2021-07-22

## 2021-07-22 ENCOUNTER — OFFICE VISIT (OUTPATIENT)
Dept: NEUROSURGERY | Facility: CLINIC | Age: 76
End: 2021-07-22
Payer: OTHER GOVERNMENT

## 2021-07-22 ENCOUNTER — TELEPHONE (OUTPATIENT)
Dept: PHYSICAL MEDICINE AND REHAB | Facility: CLINIC | Age: 76
End: 2021-07-22

## 2021-07-22 VITALS
WEIGHT: 198 LBS | RESPIRATION RATE: 18 BRPM | HEART RATE: 65 BPM | DIASTOLIC BLOOD PRESSURE: 71 MMHG | HEIGHT: 69 IN | SYSTOLIC BLOOD PRESSURE: 117 MMHG | BODY MASS INDEX: 29.33 KG/M2

## 2021-07-22 DIAGNOSIS — M51.36 DEGENERATIVE DISC DISEASE, LUMBAR: ICD-10-CM

## 2021-07-22 DIAGNOSIS — M54.16 LUMBAR RADICULOPATHY: ICD-10-CM

## 2021-07-22 DIAGNOSIS — M53.3 SACROILIAC JOINT DYSFUNCTION OF RIGHT SIDE: ICD-10-CM

## 2021-07-22 DIAGNOSIS — R29.898 WEAKNESS OF RIGHT LOWER EXTREMITY: ICD-10-CM

## 2021-07-22 DIAGNOSIS — M53.3 SACROILIAC JOINT DYSFUNCTION OF RIGHT SIDE: Primary | ICD-10-CM

## 2021-07-22 PROCEDURE — 72082 X-RAY EXAM ENTIRE SPI 2/3 VW: CPT | Mod: TC

## 2021-07-22 PROCEDURE — 72082 XR SPINE SURVEY AP AND LATERAL: ICD-10-PCS | Mod: 26,,, | Performed by: RADIOLOGY

## 2021-07-22 PROCEDURE — 99213 OFFICE O/P EST LOW 20 MIN: CPT | Mod: S$PBB,,, | Performed by: PHYSICIAN ASSISTANT

## 2021-07-22 PROCEDURE — 99999 PR PBB SHADOW E&M-EST. PATIENT-LVL IV: CPT | Mod: PBBFAC,,, | Performed by: PHYSICIAN ASSISTANT

## 2021-07-22 PROCEDURE — 99213 PR OFFICE/OUTPT VISIT, EST, LEVL III, 20-29 MIN: ICD-10-PCS | Mod: S$PBB,,, | Performed by: PHYSICIAN ASSISTANT

## 2021-07-22 PROCEDURE — 72082 X-RAY EXAM ENTIRE SPI 2/3 VW: CPT | Mod: 26,,, | Performed by: RADIOLOGY

## 2021-07-22 PROCEDURE — 99999 PR PBB SHADOW E&M-EST. PATIENT-LVL IV: ICD-10-PCS | Mod: PBBFAC,,, | Performed by: PHYSICIAN ASSISTANT

## 2021-07-22 PROCEDURE — 99214 OFFICE O/P EST MOD 30 MIN: CPT | Mod: PBBFAC,PO | Performed by: PHYSICIAN ASSISTANT

## 2021-08-17 DIAGNOSIS — M51.36 DEGENERATIVE DISC DISEASE, LUMBAR: ICD-10-CM

## 2021-08-17 DIAGNOSIS — M54.16 LUMBAR RADICULOPATHY: ICD-10-CM

## 2021-08-17 DIAGNOSIS — R29.898 WEAKNESS OF RIGHT LOWER EXTREMITY: ICD-10-CM

## 2021-08-17 DIAGNOSIS — M48.062 LUMBAR STENOSIS WITH NEUROGENIC CLAUDICATION: ICD-10-CM

## 2021-08-17 DIAGNOSIS — M53.3 SACROILIAC JOINT DYSFUNCTION OF RIGHT SIDE: Primary | ICD-10-CM

## 2021-08-20 ENCOUNTER — OFFICE VISIT (OUTPATIENT)
Dept: PHYSICAL MEDICINE AND REHAB | Facility: CLINIC | Age: 76
End: 2021-08-20
Payer: OTHER GOVERNMENT

## 2021-08-20 VITALS
HEIGHT: 69 IN | DIASTOLIC BLOOD PRESSURE: 69 MMHG | WEIGHT: 197 LBS | SYSTOLIC BLOOD PRESSURE: 140 MMHG | BODY MASS INDEX: 29.18 KG/M2 | HEART RATE: 63 BPM | RESPIRATION RATE: 14 BRPM

## 2021-08-20 DIAGNOSIS — M54.16 LUMBAR RADICULOPATHY: ICD-10-CM

## 2021-08-20 DIAGNOSIS — M53.3 SACROILIAC JOINT DYSFUNCTION OF RIGHT SIDE: ICD-10-CM

## 2021-08-20 PROCEDURE — 99213 OFFICE O/P EST LOW 20 MIN: CPT | Mod: PBBFAC,25 | Performed by: PHYSICAL MEDICINE & REHABILITATION

## 2021-08-20 PROCEDURE — 95886 MUSC TEST DONE W/N TEST COMP: CPT | Mod: PBBFAC | Performed by: PHYSICAL MEDICINE & REHABILITATION

## 2021-08-20 PROCEDURE — 99999 PR PBB SHADOW E&M-EST. PATIENT-LVL III: ICD-10-PCS | Mod: PBBFAC,,, | Performed by: PHYSICAL MEDICINE & REHABILITATION

## 2021-08-20 PROCEDURE — 99204 PR OFFICE/OUTPT VISIT, NEW, LEVL IV, 45-59 MIN: ICD-10-PCS | Mod: 25,S$PBB,, | Performed by: PHYSICAL MEDICINE & REHABILITATION

## 2021-08-20 PROCEDURE — 95886 MUSC TEST DONE W/N TEST COMP: CPT | Mod: 26,S$PBB,, | Performed by: PHYSICAL MEDICINE & REHABILITATION

## 2021-08-20 PROCEDURE — 95908 NRV CNDJ TST 3-4 STUDIES: CPT | Mod: 26,S$PBB,, | Performed by: PHYSICAL MEDICINE & REHABILITATION

## 2021-08-20 PROCEDURE — 99204 OFFICE O/P NEW MOD 45 MIN: CPT | Mod: 25,S$PBB,, | Performed by: PHYSICAL MEDICINE & REHABILITATION

## 2021-08-20 PROCEDURE — 95886 PR EMG COMPLETE, W/ NERVE CONDUCTION STUDIES, 5+ MUSCLES: ICD-10-PCS | Mod: 26,S$PBB,, | Performed by: PHYSICAL MEDICINE & REHABILITATION

## 2021-08-20 PROCEDURE — 99999 PR PBB SHADOW E&M-EST. PATIENT-LVL III: CPT | Mod: PBBFAC,,, | Performed by: PHYSICAL MEDICINE & REHABILITATION

## 2021-08-20 PROCEDURE — 95908 NRV CNDJ TST 3-4 STUDIES: CPT | Mod: PBBFAC | Performed by: PHYSICAL MEDICINE & REHABILITATION

## 2021-08-20 PROCEDURE — 95908 PR NERVE CONDUCTION STUDY; 3-4 STUDIES: ICD-10-PCS | Mod: 26,S$PBB,, | Performed by: PHYSICAL MEDICINE & REHABILITATION

## 2021-08-23 ENCOUNTER — HISTORICAL (OUTPATIENT)
Dept: ADMINISTRATIVE | Facility: HOSPITAL | Age: 76
End: 2021-08-23

## 2021-08-23 LAB
ABS NEUT (OLG): 5 X10(3)/MCL (ref 2.1–9.2)
ALBUMIN SERPL-MCNC: 4.4 GM/DL (ref 3.4–5)
ALBUMIN/GLOB SERPL: 1.76 {RATIO} (ref 1.5–2.5)
ALP SERPL-CCNC: 55 UNIT/L (ref 38–126)
ALT SERPL-CCNC: 12 UNIT/L (ref 7–52)
APPEARANCE, UA: ABNORMAL
AST SERPL-CCNC: 15 UNIT/L (ref 15–37)
BACTERIA #/AREA URNS AUTO: ABNORMAL /HPF
BILIRUB SERPL-MCNC: 0.8 MG/DL (ref 0.2–1)
BILIRUB UR QL STRIP: NEGATIVE MG/DL
BILIRUBIN DIRECT+TOT PNL SERPL-MCNC: 0.2 MG/DL (ref 0–0.5)
BILIRUBIN DIRECT+TOT PNL SERPL-MCNC: 0.6 MG/DL
BUN SERPL-MCNC: 25 MG/DL (ref 7–18)
CALCIUM SERPL-MCNC: 9.4 MG/DL (ref 8.5–10.1)
CHLORIDE SERPL-SCNC: 102 MMOL/L (ref 98–107)
CHOLEST SERPL-MCNC: 118 MG/DL (ref 0–200)
CHOLEST/HDLC SERPL: 2.8 {RATIO}
CO2 SERPL-SCNC: 28 MMOL/L (ref 21–32)
COLOR UR: YELLOW
CREAT SERPL-MCNC: 1 MG/DL (ref 0.6–1.3)
ERYTHROCYTE [DISTWIDTH] IN BLOOD BY AUTOMATED COUNT: 12.4 % (ref 11.5–17)
EST. AVERAGE GLUCOSE BLD GHB EST-MCNC: 148 MG/DL
GLOBULIN SER-MCNC: 2.5 GM/DL (ref 1.2–3)
GLUCOSE (UA): NEGATIVE MG/DL
GLUCOSE SERPL-MCNC: 137 MG/DL (ref 74–106)
HBA1C MFR BLD: 6.8 % (ref 4.4–6.4)
HCT VFR BLD AUTO: 40.5 % (ref 42–52)
HDLC SERPL-MCNC: 42 MG/DL (ref 35–60)
HGB BLD-MCNC: 13.7 GM/DL (ref 14–18)
HGB UR QL STRIP: NEGATIVE UNIT/L
KETONES UR QL STRIP: NEGATIVE MG/DL
LDLC SERPL CALC-MCNC: 57 MG/DL (ref 0–129)
LEUKOCYTE ESTERASE UR QL STRIP: ABNORMAL UNIT/L
LYMPHOCYTES # BLD AUTO: 2.3 X10(3)/MCL (ref 0.6–3.4)
LYMPHOCYTES NFR BLD AUTO: 28.6 % (ref 13–40)
MCH RBC QN AUTO: 31.6 PG (ref 27–31.2)
MCHC RBC AUTO-ENTMCNC: 34 GM/DL (ref 32–36)
MCV RBC AUTO: 93 FL (ref 80–94)
MONOCYTES # BLD AUTO: 0.6 X10(3)/MCL (ref 0.1–1.3)
MONOCYTES NFR BLD AUTO: 7.9 % (ref 0.1–24)
NEUTROPHILS NFR BLD AUTO: 63.5 % (ref 47–80)
NITRITE UR QL STRIP.AUTO: NEGATIVE
PH UR STRIP: 6 [PH]
PLATELET # BLD AUTO: 262 X10(3)/MCL (ref 130–400)
PMV BLD AUTO: 8.5 FL (ref 9.4–12.4)
POTASSIUM SERPL-SCNC: 4.5 MMOL/L (ref 3.5–5.1)
PROT SERPL-MCNC: 6.9 GM/DL (ref 6.4–8.2)
PROT UR QL STRIP: NEGATIVE MG/DL
PSA SERPL-MCNC: 1.5 NG/ML (ref 0–6.5)
RBC # BLD AUTO: 4.34 X10(6)/MCL (ref 4.7–6.1)
RBC #/AREA URNS HPF: ABNORMAL /HPF
SODIUM SERPL-SCNC: 139 MMOL/L (ref 136–145)
SP GR UR STRIP: 1.02
SQUAMOUS EPITHELIAL, UA: ABNORMAL /LPF
TRIGL SERPL-MCNC: 96 MG/DL (ref 30–150)
UROBILINOGEN UR STRIP-ACNC: 0.2 MG/DL
VLDLC SERPL CALC-MCNC: 19.2 MG/DL
WBC # SPEC AUTO: 7.9 X10(3)/MCL (ref 4.5–11.5)
WBC #/AREA URNS AUTO: ABNORMAL /[HPF]

## 2021-08-31 ENCOUNTER — TELEPHONE (OUTPATIENT)
Dept: NEUROSURGERY | Facility: CLINIC | Age: 76
End: 2021-08-31

## 2021-09-13 ENCOUNTER — TELEPHONE (OUTPATIENT)
Dept: NEUROSURGERY | Facility: CLINIC | Age: 76
End: 2021-09-13

## 2021-09-23 ENCOUNTER — TELEPHONE (OUTPATIENT)
Dept: NEUROSURGERY | Facility: CLINIC | Age: 76
End: 2021-09-23

## 2021-09-24 ENCOUNTER — OFFICE VISIT (OUTPATIENT)
Dept: NEUROSURGERY | Facility: CLINIC | Age: 76
End: 2021-09-24
Payer: OTHER GOVERNMENT

## 2021-09-24 VITALS
SYSTOLIC BLOOD PRESSURE: 134 MMHG | HEART RATE: 57 BPM | WEIGHT: 197 LBS | BODY MASS INDEX: 29.18 KG/M2 | RESPIRATION RATE: 16 BRPM | DIASTOLIC BLOOD PRESSURE: 67 MMHG | HEIGHT: 69 IN

## 2021-09-24 DIAGNOSIS — M51.36 DDD (DEGENERATIVE DISC DISEASE), LUMBAR: ICD-10-CM

## 2021-09-24 DIAGNOSIS — M54.16 LUMBAR RADICULOPATHY: ICD-10-CM

## 2021-09-24 DIAGNOSIS — M53.3 SACROILIAC JOINT DYSFUNCTION OF RIGHT SIDE: Primary | ICD-10-CM

## 2021-09-24 PROCEDURE — 99999 PR PBB SHADOW E&M-EST. PATIENT-LVL III: CPT | Mod: PBBFAC,,, | Performed by: PHYSICIAN ASSISTANT

## 2021-09-24 PROCEDURE — 99213 PR OFFICE/OUTPT VISIT, EST, LEVL III, 20-29 MIN: ICD-10-PCS | Mod: S$PBB,,, | Performed by: PHYSICIAN ASSISTANT

## 2021-09-24 PROCEDURE — 99213 OFFICE O/P EST LOW 20 MIN: CPT | Mod: PBBFAC | Performed by: PHYSICIAN ASSISTANT

## 2021-09-24 PROCEDURE — 99213 OFFICE O/P EST LOW 20 MIN: CPT | Mod: S$PBB,,, | Performed by: PHYSICIAN ASSISTANT

## 2021-09-24 PROCEDURE — 99999 PR PBB SHADOW E&M-EST. PATIENT-LVL III: ICD-10-PCS | Mod: PBBFAC,,, | Performed by: PHYSICIAN ASSISTANT

## 2022-04-10 ENCOUNTER — HISTORICAL (OUTPATIENT)
Dept: ADMINISTRATIVE | Facility: HOSPITAL | Age: 77
End: 2022-04-10
Payer: OTHER GOVERNMENT

## 2022-04-25 VITALS
WEIGHT: 205.5 LBS | SYSTOLIC BLOOD PRESSURE: 132 MMHG | HEIGHT: 69 IN | DIASTOLIC BLOOD PRESSURE: 58 MMHG | BODY MASS INDEX: 30.44 KG/M2

## 2022-04-30 NOTE — H&P
Patient:   Milligan, Aubrey             MRN: [id number]            FIN: [id number]               Age:   72 years     Sex:  Male     :  1945   Associated Diagnoses:   None   Author:   Ju Han MD      Acute Care Surgery History and Physical    HPI: Pt is a 73 y/o M w/ hx of diverticulitis who presents to the ED w/ 1 day hx of RLQ pain.  Pt reports pain persistent w/ no relief over the past day.  Reports feeling a bulge at R side over the past day.  Also has associated emesis and diarrhea over the past day.  Reports feeling febrile at home.  Voiding w/ no issues.  Pt reports pain similar to previous attacks of diverticulitis but in a different location.  Last colonoscopy was about 5 years ago w/ benign polyps removed.  Due for another colonoscopy.  Reports no CP or SOB.    ROS: see HPI; 14 point ROS otherwise negative    PMHx: DM; diverticulitis; HTN  PSHx: lap lawanda  Meds: see med rec; no blood thinners  Allergies: keflex, lodine, macrobid, mobic, polysporin, viagra  Fam Hx: non-contributory  Social Hx: no tobacco, occasional alcohol, no IVDU    Temp: 98.6   HR: 75   RR: 16   BP: 150/67   SpO2: 97% on RA    PE: General: AAO X 3; NAD         HEENT: normocephalic; atraumatic         CV: RRR; no murmurs, rubs, or gallops         Resp: CTA b/l; no wheezes, crackles, or rhonchi         Abdomen: soft; TTP at R abdomen; ND; + Rosving sign         Extremities: motor and sensation intact to all extremities    Labs: Cr: 1.42; WBC: 14.3    CT Abdomen/Pelvis: prominent appendix w/ inflammatory changes    A/P: 73 y/o M w/ acute appendicitis  - admit to observation  - NPO; IVF; IV Zosyn  - to OR in AM for Laparoscopic Appendectomy

## 2022-04-30 NOTE — ED PROVIDER NOTES
Patient:   Milligan, Aubrey             MRN: [id number]            FIN: [id number]               Age:   72 years     Sex:  Male     :  1945   Associated Diagnoses:   Acute appendicitis   Author:   Suhas Sow MD      Basic Information   Time seen: Date & time 2018 17:36:00.   History source: Patient.   Arrival mode: Private vehicle, walking.   History limitation: None.      History of Present Illness   The patient presents with abdominal pain, 72-year-old white male with diffuse diverticulitis presents with complaints of bilateral lower sharp pain intermittent for 2 days with some swelling noted of the right  lateral lower abdomen.  Diarrhea today.  No fever or vomiting. Beckie Chen, RANDELL SORT NOTE and I, Dr. Sow, assumed care of this patient at .    71 y/o white male with hx of diverticulitis, HTN, and DM presents to ED with complaints of bilateral abdominal pain x2 days. Patients associated sx include diarrhea and fever. Patient reports pain is similar to diverticulitis however right side of the abdomen started to swell which was abnormal. Patient finished antibiotic for diverticulitis when he was here for catheterization in May. Patient denies nausea. .  The onset was 2  days ago.  The course/duration of symptoms is constant.  The character of symptoms is pain.  The degree at onset was moderate.  The Location of pain at onset was diffuse and abdominal.  The degree at present is moderate.  The Location of pain at present is diffuse and abdominal.  Radiating pain: none. The exacerbating factor is none.  The relieving factor is none.  Therapy today: none.  Risk factors consist of diabetes mellitus, hypertension and diverticulosis.  Associated symptoms: diarrhea, fever and denies nausea.  Additional history: none.        Review of Systems   Constitutional symptoms:  Fever, no chills, no sweats, no weakness.    Skin symptoms:  No rash, no pruritus.    Eye symptoms:  Vision unchanged.    ENMT symptoms:  No sore throat, no nasal congestion.    Respiratory symptoms:  No shortness of breath, no cough.    Cardiovascular symptoms:  No chest pain, no palpitations, no tachycardia.    Gastrointestinal symptoms:  Abdominal pain, diffuse, pain, diarrhea, no nausea, no vomiting, no constipation.    Genitourinary symptoms:  No dysuria, no hematuria.    Musculoskeletal symptoms:  No back pain, no Muscle pain.    Neurologic symptoms:  No headache, no dizziness, no numbness, no tingling, no weakness.              Additional review of systems information: All other systems reviewed and otherwise negative, All systems reviewed as documented in chart.      Health Status   Allergies:    Allergic Reactions (Selected)  Severity Not Documented  Keflex- No reactions were documented.  Lodine- No reactions were documented.  Macrobid- No reactions were documented.  Mobic- No reactions were documented.  Polysporin- No reactions were documented.  Viagra- No reactions were documented..   Medications:  (Selected)   Documented Medications  Documented  Aloe Vera: 25 mg =, Oral, Daily, 0 Refill(s)  Cialis 5 mg oral tablet: 5 mg = 1 tab(s), Oral, Daily, 1 hour before sexual activity  Ciprofloxacin Hcl 500 Mg Tab: 500 mg = 1 tab(s), Oral, BID  Crestor 5 mg oral tablet: 5 mg = 1 tab(s), Oral, Daily, # 30 tab(s), 0 Refill(s)  MVI with Minerals (Adult Tab): 1 tab(s), Oral, Daily  Metronidazole 500 Mg Tablet: 500 mg = 1 tab(s), Oral, q8hr  Probiotic Formula (Bacillus Coagulans) oral capsule: 1 cap(s), Oral, Daily  Urinozinc: Urinozinc, 1 tab(s), Oral, Daily  amlodipine 5 mg oral tablet: 5 mg = 1 tab(s), Oral, Daily, # 30 tab(s), 0 Refill(s)  aspirin 81 mg oral tablet, CHEWABLE: 81 mg = 1 tab(s), Oral, Daily, # 30 tab(s), 0 Refill(s)  finasteride 5 mg oral tablet: 5 mg = 1 tab(s), Oral, Daily, # 30 tab(s), 0 Refill(s)  glipiZIDE 2.5 mg oral tablet, extended release (XL tab): 2.5 mg = 1 tab(s), Oral, Daily, # 30 tab(s), 0  Refill(s)  glucosamine: = 1 tab(s), Oral, Daily, currently not takling at this time, 0 Refill(s)  hydrochlorothiazide-lisinopril 12.5 mg-20 mg oral tablet: 1 tab(s), Oral, Daily  omeprazole 40 mg oral DR capsule: 40 mg = 1 cap(s), Oral, At Bedtime.      Past Medical/ Family/ Social History   Medical history:    Active  can lie flat ([id number])  can walk 2 blocks w/o CP or SOB ([id number])  Arthritis ([id number])  Comments:  10/28/2013 CDT 9:57 CDCandace Murillo RN  (small)  bruise easily ([id number])  Comments:  10/28/2013 CDT 9:54 Candace Escalante RN  Asa tx  Chronic back pain ([id number])  Comments:  10/28/2013 CDT 9:58 CDCandace Murillo RN  (at times)  DM (diabetes mellitus) ([id number])  Hearing loss ([id number])  HTN (hypertension) ([id number])  Comments:  10/28/2013 CDT 10:11 LINDAT Candace Grant RN  no cardiologist  elevated cholestrol ([id number])  Wears glasses ([id number])  Resolved  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   Surgical history:    Cystoscopy/Retrograde Pyelogram (Bilateral) on 10/29/2013 at 68 Years.  Comments:  10/29/2013 12:40 - Coco Addison RN  auto-populated from documented surgical case  gallbladder removal in 2011 at 66 Years.  removal of malignant melanoma in 1979 at 34 Years..   Family history:    No family history items have been selected or recorded..   Social history:    Social & Psychosocial Habits    Alcohol  10/28/2013  Use: Current    Type: Beer    Comment: occasionally - 10/28/2013 10:02 - Candace Garcia RN    Employment/School  10/28/2013  Highest education: University degree(s)    Home/Environment  10/28/2013  Lives with: Spouse    Home equipment: Glucose monitoring    Nutrition/Health  10/28/2013  Type of diet: Diabetic    Substance Abuse  10/28/2013 Risk Assessment: Denies Substance Abuse    Tobacco  10/28/2013 Risk Assessment: Denies Tobacco Use  .    Problem list:    Active Problems (10)  Arthritis   bruise easily   can lie flat   can walk 2 blocks w/o CP or SOB   Chronic back pain   DM (diabetes mellitus)   elevated cholestrol   Hearing loss   HTN (hypertension)   Wears glasses   .      Physical Examination               Vital Signs   Vital Signs   7/29/2018 17:34 CDT      Temperature Oral          37 DegC                             Temperature Oral (calculated)             98.60 DegF                             Peripheral Pulse Rate     78 bpm                             Respiratory Rate          18 br/min                             SpO2                      95 %                             Oxygen Therapy            Room air                             Systolic Blood Pressure   124 mmHg                             Diastolic Blood Pressure  60 mmHg  .   Measurements   7/29/2018 17:34 CDT      Weight Dosing             86 kg                             Weight Measured and Calculated in Lbs     189.60 lb                             Weight Estimated          86 kg                             Height/Length Dosing      175.26 cm                             Height/Length Estimated   175.26 cm                             Body Mass Index Estimated 28 kg/m2  .   Basic Oxygen Information   7/29/2018 19:52 CDT      SpO2                      98 %                             Oxygen Therapy            Room air  .   General:  Alert, no acute distress.    Skin:  Warm, dry, no rash.    Head:  Normocephalic, atraumatic.    Neck:  Supple, trachea midline, no tenderness.    Eye:  Extraocular movements are intact, vision unchanged.    Ears, nose, mouth and throat:  Oral mucosa moist.   Respiratory:  Lungs are clear to auscultation, respirations are non-labored, breath sounds are equal, Symmetrical chest wall expansion.    Cardiovascular:  Regular rate and rhythm, No murmur, Normal peripheral perfusion.    Gastrointestinal:  Soft, Non distended, Normal bowel sounds, No  organomegaly, moderate tenderness to palpation to suprapubic and RLQ.    Musculoskeletal:  Normal ROM, normal strength.    Neurological:  Alert and oriented to person, place, time, and situation, No focal neurological deficit observed, normal motor observed.    Psychiatric:  Cooperative, appropriate mood & affect.       Medical Decision Making   Documents reviewed:  Emergency department nurses' notes.   Orders  Launch Order Profile (Selected)   Inpatient Orders  Completed  Automated Diff: STAT collect, 07/29/18 17:49:00 CDT, Blood, Collected, Stop date 07/29/18 17:49:00 CDT, Lab Collect, Print Label By Order Location, 07/29/18 17:38:00 CDT  CBC - includes Diff: STAT collect, 07/29/18 17:49:12 CDT, BLOOD, Collected, Stop date 07/29/18 17:49:00 CDT, Lab Collect  CMP: STAT collect, 07/29/18 17:49:12 CDT, BLOOD, Collected, Stop date 07/29/18 17:49:00 CDT, Lab Collect  Estimated Glomerular Filtration Rate: STAT collect, 07/29/18 17:49:00 CDT, Blood, Collected, Stop date 07/29/18 17:49:00 CDT, Lab Collect, Print Label By Order Location, 07/29/18 17:38:00 CDT  UA a reflex to culture: Stat collect, Urine, 07/29/18 17:38:00 CDT, Stop date 07/29/18 17:39:00 CDT, Nurse collect, Print Label By Order Location  UA a reflex to culture: Stat collect, Urine, 07/29/18 19:26:00 CDT, Stop date 07/29/18 19:26:00 CDT, Nurse collect, Print Label By Order Location.   Results review:  Lab results : Lab View   7/29/2018 19:27 CDT      UA Appear                 CLEAR                             UA Color                  DK YELLOW                             UA Spec Grav              1.026                             UA Bili                   1+                             UA pH                     5.5                             UA Urobilinogen           1.0                             UA Blood                  Negative                             UA Glucose                Negative                             UA Ketones                Trace                              UA Protein                1+                             UA Nitrite                Negative                             UA Leuk Est               Trace                             UA WBC                    NONE SEEN /HPF                             UA RBC                    NONE SEEN                             UA Bacteria               NONE SEEN /HPF                             UA Squam Epithelial       NONE SEEN    7/29/2018 17:49 CDT      Sodium Lvl                140 mmol/L                             Potassium Lvl             4.1 mmol/L                             Chloride                  103 mmol/L                             CO2                       31.0 mmol/L                             Calcium Lvl               8.9 mg/dL                             Glucose Lvl               116 mg/dL  HI                             BUN                       26.0 mg/dL  HI                             Creatinine                1.42 mg/dL  HI                             eGFR-AA                   >60 mL/min/1.73 m2  NA                             eGFR-SANDEEP                  52 mL/min/1.73 m2  NA                             Bili Total                0.6 mg/dL                             Bili Direct               0.20 mg/dL                             Bili Indirect             0.40 mg/dL                             AST                       9 unit/L  LOW                             ALT                       20 unit/L                             Alk Phos                  66 unit/L                             Total Protein             7.1 gm/dL                             Albumin Lvl               3.70 gm/dL                             Globulin                  3.40 gm/dL                             A/G Ratio                 1.1 ratio                             WBC                       14.3 x10(3)/mcL  HI                             RBC                       4.51 x10(6)/mcL  LOW                              Hgb                       14.0 gm/dL                             Hct                       43.0 %                             Platelet                  218 x10(3)/mcL                             MCV                       95.3 fL  HI                             MCH                       31.0 pg                             MCHC                      32.6 gm/dL  LOW                             RDW                       12.7 %                             MPV                       8.8 fL  LOW                             Abs Neut                  10.77 x10(3)/mcL  HI                             Neutro Auto               75 %  NA                             Lymph Auto                13 %  NA                             Mono Auto                 10 %  NA                             Eos Auto                  1 %  NA                             Abs Eos                   0.1 x10(3)/mcL                             Basophil Auto             0 %  NA                             Abs Neutro                10.77 x10(3)/mcL  HI                             Abs Lymph                 1.9 x10(3)/mcL                             Abs Mono                  1.5 x10(3)/mcL  HI                             Abs Baso                  0.0 x10(3)/mcL    7/29/2018 17:45 CDT      UA Appear                 N/A  (Modified)                            UA Color                  N/A  (Modified)                            UA Spec Grav              N/A  (Modified)                            UA Bili                   N/A  (Modified)                            UA pH                     N/A  (Modified)                            UA Urobilinogen           N/A  (Modified)                            UA Blood                  N/A  (Modified)                            UA Glucose                N/A  (Modified)                            UA Ketones                N/A  (Modified)                            UA Protein                N/A  (Modified)                             UA Nitrite                N/A  (Modified)                            UA Leuk Est               N/A  (Modified)                            UA WBC                    N/A /HPF  (Modified)                            UA RBC                    N/A /HPF  (Modified)                            UA Bacteria               N/A /HPF  (Modified)                            UA Squam Epithelial       N/A /HPF  (Modified) .   Radiology results:  Rad Results (ST)  < 12 hrs   Accession: [id number]  Order: CT Abdomen and Pelvis W/O Contrast  Report Dt/Tm: 07/29/2018 21:22  Report:   Clinical History:  Lower abdominal pain.     Technique:  Multidetector non-contrast axial CT images of the abdomen and pelvis  were obtained with coronal and sagittal reconstructions.      Automatic exposure control was utilized to reduce the patient's  radiation dose.     Comparison:  January 15, 2011     Findings:     01. HEPATOBILIARY: No focal hepatic lesion is identified, however  evaluation is limited secondary to lack of IV contrast. Status post  cholecystectomy.     02. SPLEEN: Normal     03. PANCREAS: No focal masses or ductal dilatation.     04. ADRENALS: No adrenal nodules.     05. KIDNEYS: The right kidney demonstrates no stone, hydronephrosis,  or hydroureter. No focal mass identified.The left kidney demonstrates  no stone, hydronephrosis, or hydroureter. No focal mass identified.     06. LYMPHADENOPATHY/RETROPERITONEUM: There is no retroperitoneal  lymphadenopathy. The abdominal aorta is normal in course and caliber.     07. BOWEL:  Diverticulosis without evidence of diverticulitis. The  appendix is prominent with surrounding inflammatory change. Consistent  with acute appendicitis.     08. PELVIC VISCERA: The prostate is markedly enlarged with protrusion  into the posterior aspect of the bladder. The bladder wall is  thickened. Cystitis is not excluded. Correlate with urinalysis.     09. PELVIC LYMPH NODES: No lymphadenopathy.     10.  PERITONEUM/ABDOMINAL WALL: No ascites or implant.     11. SKELETAL: No aggressive appearing lytic/blastic lesion. No acute  fractures, subluxations or dislocations.     12. LUNG BASES: The visualized lungs are unremarkable.     Impression  The appendix is prominent with surrounding inflammatory change.  Consistent with acute appendicitis.     The prostate is markedly enlarged with protrusion into the posterior  aspect of the bladder. The bladder wall is thickened. Cystitis is not  excluded. Correlate with urinalysis.     Findings reported to ER physician at the time of interpretation.      .      Reexamination/ Reevaluation   Time: 7/29/2018 21:26:00 .   Course: well controlled.      Impression and Plan   Diagnosis   Acute appendicitis ([id number]-SL K35.80)      Calls-Consults   -  7/29/2018 21:35:00 , Dr. Chang-will come to evaluate.    Plan   Condition: Stable.    Disposition: Admit time  7/29/2018 21:36:00, Admit to Surgery.    Counseled: Patient, Family, Regarding diagnosis, Regarding diagnostic results, Regarding treatment plan, Patient indicated understanding of instructions.    Notes: I, Barbra Guadarrama, acted solely as a scribe for and in the presence of Dr. Sow who performed the service. .

## 2022-04-30 NOTE — OP NOTE
Patient:   Milligan, Aubrey             MRN: [id number]            FIN: [id number]               Age:   72 years     Sex:  Male     :  1945   Associated Diagnoses:   None   Author:   Jyoti Worthy MD      Brief Operative Note   Operative Information   Date/ Time:  2018 10:14:00.     Preoperative Diagnosis: Acute Appendicitis.     Postoperative Diagnosis: same  .     Procedures Performed: Laparoscopic Appendectomy.     Surgeon: Jyoti Worthy MD.     Assistant: Eze Swartz MD Removed: appendix.     Esimated blood loss: loss  25  cc.     Description of Procedure/Findings/    Complications: After informed consent was obtained, the patient was taken to the operative suite and general endotracheal anesthesia was induced. A timeout was performed confirming patient and procedure and the abdomen was prepped and draped in the normal sterile fashion. A 5mm incision was made above the patients umbilicus and a Veres needle was used to establish abdominal insufflation. Using an 5mm optiview trocar the camera was introduced, and under direct visualization a 12mm port was placed in the left lower quadrant as well as a 5mm port suprapubically. We then placed the patient in trendelenburg and airplaned toward the left. The appendix was visualized, a harmonic energy device was used to mobilize the extremely inflamed appendix from its adhesions, then the appendix was grasped and a window was bluntly dissected in the mesentery adjacent to the appendix. A blue staple load on an endoGIA stapler was used to transect the appendiceal base and a white staple load was used to transect the appendiceal mesentery. The appendix was placed in an endocatch bag and removed through the 12mm port. The appendiceal base was inspected and found to be hemostatic after one 5mm clip was applied to the mesenteric staple line. The ports were removed under direct visualization and the 12mm port fascia was closed  with 0 vicryl suture. The skin was closed with 4-0 vicryl suture and sterile dressings were applied. All sponge and needle counts were correct at the completion of the case, the patient was awakened from anesthesia, extubated, and taken to the PACU in stable condition. .

## 2022-05-03 NOTE — HISTORICAL [facility] CERNER
This is a historical note converted from José. Formatting and pictures may have been removed.  Please reference José for original formatting and attached multimedia. Chief Complaint  6 MONTH FOLLOW UP/NOT FASTING  History of Present Illness  6 month f/u DM.? 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 He had the appendix removed?during surgery and is scheduled to have?a resection in the next few weeks.? He is tolerating all his medications without side effects. ?His diabetic eye exam is up-to-date.  Review of Systems  General:???no?weightgain?_  HEENT:???novision changes,? dm eye exam?less than 1 yr ago  CARDIAC:?no?chest pain, SOB,  GI:?no?diarrhea,?no?n&v  ENDOCRINE:?nopolyuria,no?polydipsia,?no?polyphagia  EXT:?no?signs of neuropathy  Physical Exam  Vitals & Measurements  HR:?68(Peripheral)? BP:?124/56?  HT:?176?cm? HT:?176?cm? WT:?87.6?kg? WT:?87.6?kg? BMI:?28.28?  GENERAL:?? alert and oriented x4  HEENT:? PERRLA, mouth and throat clear, mucous membranes moist  CHEST:? CTA bilaterally  CARDIAC:? RRR no murmurs audible  EXT:?no? ?edema to lower extremities? ?bilaterally,?diabetic foot exam today? See form  Assessment/Plan  1.?Diabetes mellitus  High dose flu shot today, Continue Glipizide 2.5 mg, ?CMP, A1C today, Continue ASA 81 mg qd,? Dm foot exam utd ,?? D[facility]d [facility]  Ordered:  Influenza Virus Vaccine, Inactivated, 0.5 mL, IM, Once-Unscheduled, first dose 08/27/18 10:34:00 CDT  Comprehensive Metabolic Panel, Routine collect, 08/27/18 10:34:00 CDT, Blood, Order for future visit, Stop date 08/27/18 10:34:00 CDT, Lab Collect, Diabetes mellitus  Hypertension  Hypercholesterolemia, 08/27/18 10:34:00 CDT  Hemoglobin A1c, Routine collect, 08/27/18 10:34:00 CDT, Blood, Order for future visit, Stop date 08/27/18 10:34:00 CDT, Lab Collect, Diabetes mellitus, 08/27/18 10:34:00 CDT  Lab Collection Request, 08/27/18  10:34:00 CDT, [facility], 08/27/18 10:34:00 CDT  ?  2.?Hypercholesterolemia  ?continue crestor 10 mg  Ordered:  Comprehensive Metabolic Panel, Routine collect, 08/27/18 10:34:00 CDT, Blood, Order for future visit, Stop date 08/27/18 10:34:00 CDT, Lab Collect, Diabetes mellitus  Hypertension  Hypercholesterolemia, 08/27/18 10:34:00 CDT  Office/Outpatient Visit Level 3 Established [id number] PC, Hypercholesterolemia  Hypertension  Primary neuroendocrine carcinoma of colon, [facility], 08/27/18 10:34:00 CDT  ?  3.?Hypertension  ?Continue Lisinopril Hct 20/25  Ordered:  Comprehensive Metabolic Panel, Routine collect, 08/27/18 10:34:00 CDT, Blood, Order for future visit, Stop date 08/27/18 10:34:00 CDT, Lab Collect, Diabetes mellitus  Hypertension  Hypercholesterolemia, 08/27/18 10:34:00 CDT  Office/Outpatient Visit Level 3 Established [id number] PC, Hypercholesterolemia  Hypertension  Primary neuroendocrine carcinoma of colon, [facility], 08/27/18 10:34:00 CDT  ?  4.?Primary neuroendocrine carcinoma of colon  Had?appendix 30th , ?Has apt with Dr. Seth for resection.  Ordered:  Office/Outpatient Visit Level 3 Established [id number] PC, Hypercholesterolemia  Hypertension  Primary neuroendocrine carcinoma of colon, [facility], 08/27/18 10:34:00 CDT  ?  Orders:  Clinic Follow up, *Est. 02/27/19 3:00:00 CST, Order for future visit, Wellness examination, ink AFP   Problem List/Past Medical History  Ongoing  Diabetes mellitus  Hypercholesterolemia  Hypertension  Primary neuroendocrine carcinoma of colon  Right sided abdominal pain  Historical  BPH - benign prostatic hyperplasia  Diabetes mellitus  Hypercholesterolemia  Hypertension  Procedure/Surgical History  APPENDECTOMY-CANCEROUS (08/02/2018)  Colonoscopy (08/23/2013)  Gallbladder (2011)  Colonoscopy (2009)  Malignant (1991)  Prostate   Medications  Adult High-Dose Influenza Vaccine, 0.5 mL, IM, Once-Unscheduled  aspirin 81 mg oral tablet, 81 mg= 1  tab(s), Oral, Daily  CIALIS 5 MG TABLET, 5 mg= 1 tab(s), Oral, Daily  Crestor 10 mg oral tablet, 10 mg= 1 tab(s), Oral, Daily  finasteride 5 mg oral tablet, 5 mg= 1 tab(s), Oral, Daily  GLIPIZIDE ER 2.5 MG TABLET, 2.5 mg= 1 tab(s), Oral, Daily  LISINOPRIL-HCTZ 20-12.5 MG TAB, 1 tab(s), Oral, Daily  omeprazole 40 mg oral DR capsule, 80 mg= 2 cap(s), Oral, Daily  Probiotic Formula (Bacillus Coagulans), 1 cap(s), Oral, Daily  Allergies  Lodine?(Unknown)  Macrobid?(Hives)  Polysporin?(Hives)  Social History  Alcohol  Current, 1-2 times per month, 05/23/2018  Employment/School  Retired, Previous employment/school: [employer] 05/23/2018  Home/Environment  Lives with Children, Spouse., 05/23/2018  Tobacco  Never smoker, 02/26/2018  Family History  Diabetes mellitus: Father.  Hearing loss: Sister.  High cholesterol: Mother, Sister and Sister.  TB - Pulmonary tuberculosis: Father.  Immunizations  Vaccine Date Status   pneumococcal 13-valent conjugate vaccine 02/12/2015 Recorded   pneumococcal 13-valent conjugate vaccine 02/12/2015 Recorded   pneumococcal 23-polyvalent vaccine 02/05/2013 Recorded   zoster vaccine live 09/14/2011 Recorded   tetanus/diphth/pertuss (Tdap) adult/adol 05/16/2011 Recorded   Health Maintenance  Health Maintenance  ???Pending?(in the next year)  ??? ??OverDue  ??? ? ? ?Pneumococcal Vaccine due??and every?  ??? ??Due?  ??? ? ? ?Abdominal Aortic Aneurysm Screening due??08/27/18??and every 100??year(s)  ??? ? ? ?Alcohol Misuse Screening due??08/27/18??and every 1??year(s)  ??? ? ? ?Diabetes Maintenance-Eye Exam due??08/27/18??and every?  ??? ? ? ?Diabetes Maintenance-Microalbumin due??08/27/18??Variable frequency  ??? ? ? ?Diabetes Maintenance-Serum Creatinine due??08/27/18??Variable frequency  ??? ? ? ?Fall Risk Assessment due??08/27/18??and every 1??year(s)  ??? ? ? ?Functional Assessment due??08/27/18??and every 1??year(s)  ??? ? ? ?Hypertension Maintenance-Medication Prescribed  due??08/27/18??and every 1??year(s)  ??? ? ? ?Hypertension Management-Education due??08/27/18??and every 1??year(s)  ??? ? ? ?Smoking Cessation due??08/27/18??and every 1??year(s)  ??? ??Due In Future?  ??? ? ? ?Diabetes Maintenance-Fasting Lipid Profile not due until??02/26/19??and every 1??year(s)  ??? ? ? ?Diabetes Maintenance-HgbA1c not due until??02/26/19??and every 1??year(s)  ??? ? ? ?Aspirin Therapy for CVD Prevention not due until??02/26/19??and every 1??year(s)  ??? ? ? ?Diabetes Maintenance-Urine Dipstick not due until??05/23/19??and every 1??year(s)  ??? ? ? ?Hypertension Management-BMP not due until??07/31/19??and every 1??year(s)  ???Satisfied?(in the past 1 year)  ??? ??Satisfied?  ??? ? ? ?Aspirin Therapy for CVD Prevention on??02/26/18.  ??? ? ? ?Blood Pressure Screening on??08/27/18.??Satisfied by Jackelyn Calabrese  ??? ? ? ?Body Mass Index Check on??08/27/18.??Satisfied by Jackelyn Calabrese  ??? ? ? ?Diabetes Maintenance-Foot Exam on??08/27/18.??Satisfied by Deion Pascal MD  ??? ? ? ?Diabetes Maintenance-HgbA1c on??08/27/18.??Satisfied by Deion Pascal MD  ??? ? ? ?Diabetes Maintenance-Urine Dipstick on??05/23/18.??Satisfied by Griselda Parra  ??? ? ? ?Diabetes Screening on??08/27/18.??Satisfied by Deion Pascal MD  ??? ? ? ?Hypertension Management-Blood Pressure on??08/27/18.??Satisfied by Jackleyn Calabrese  ??? ? ? ?Influenza Vaccine on??08/27/18.??Satisfied by Gwyn LAGOS, Deion GOLDMAN  ??? ? ? ?Obesity Screening on??08/27/18.??Satisfied by aJckelyn Calabrese  ?  ?

## 2022-05-03 NOTE — HISTORICAL [facility] CERNER
This is a historical note converted from José. Formatting and pictures may have been removed.  Please reference José for original formatting and attached multimedia. Chief Complaint  RIGHT SIDE PAIN X 3 DAYS PT STATES HE TOOK MIRALAX TO HELP CLEAN HIM OUT  History of Present Illness  Patient is here today with complaints of?right side?abdomen pain?for the past 2 days.? He reports his bowel movements?not being normal?over the last couple of days.? His stool was described as pasty.? He was volunteering at the [facility] yesterday and asked the nurse there who recommend?he try over-the-counter MiraLAX. ?He did this last night?and did get some relief?of the pain but it is still present.? He did have a bowel movement?with this therapy.? He has not had any fever but he does report waking up last night and sweating.? No nausea vomiting. ?He does not remember injury to the area.  Review of Systems  General:?Sweating last night however no documented fever, appetite?is fair  Respiratory: No cough congestion  GI:?Positive constipation over the past couple of days improved with taking MiraLAX,?no nausea vomiting  :?No hematuria?or dysuria  ?  Physical Exam  Vitals & Measurements  T:?37.1? ?C (Oral)? HR:?72(Peripheral)? BP:?112/72?  HT:?176.5?cm? HT:?176.5?cm? WT:?88.8?kg? WT:?88.8?kg? BMI:?28.51?  General:?Nontoxic appearance  HEENT: Nonicteric sclera, mucous membranes moist  Abdomen:?Positive tenderness with palpation?along the?right side of the abdomen?just below?costal margin?in the axilla?line.? Slight increase in pain?with?left?side bend, no pain with right side pain,?no pain with palpation over McBurneys point,?no rebound or guarding  Assessment/Plan  1.?Right sided abdominal pain  ?CBC CMP and?abdominal flat and erect today.? Discussed diverticulitis (right sided). PT?had colon with right sided diverticuli. pt will observe and if worsens will call for antibiotics.? Seems to be improving  Ordered:  Office/Outpatient  Visit Level 3 Established [id number] PC, Right sided abdominal pain, [facility], 05/23/18 14:10:00 CDT  XR Abdomen Flat and Erect, Routine, 05/23/18 13:33:00 CDT, Other (please specify), None, Ambulatory, Rad Type, Right sided abdominal pain, [facility], 05/23/18 13:33:00 CDT  ?  Orders:  Clinic Follow-up PRN, 05/23/18 14:10:00 CDT, [facility], Future Order   Problem List/Past Medical History  Ongoing  Diabetes mellitus  Hypercholesterolemia  Hypertension  Right sided abdominal pain  Historical  BPH - benign prostatic hyperplasia  Diabetes mellitus  Hypercholesterolemia  Hypertension  Procedure/Surgical History  Colonoscopy (08/23/2013), Gallbladder (2011), Colonoscopy (2009), Malignant (1991), Prostate.  Medications  aspirin 81 mg oral tablet, 81 mg= 1 tab(s), Oral, Daily  CIALIS 5 MG TABLET, 5 mg= 1 tab(s), Oral, Daily  Crestor 10 mg oral tablet, 10 mg= 1 tab(s), Oral, Daily  finasteride 5 mg oral tablet, 5 mg= 1 tab(s), Oral, Daily  GLIPIZIDE ER 2.5 MG TABLET, 2.5 mg= 1 tab(s), Oral, Daily  LISINOPRIL-HCTZ 20-12.5 MG TAB, 1 tab(s), Oral, Daily  omeprazole 40 mg oral DR capsule, 40 mg= 1 cap(s), Oral, Daily  Probiotic Formula (Bacillus Coagulans), 1 cap(s), Oral, Daily  Allergies  Lodine?(Unknown)  Macrobid?(Hives)  Polysporin?(Hives)  Social History  Alcohol  Current, 1-2 times per month, 05/23/2018  Employment/School  Retired, Previous employment/school: [employer] 05/23/2018  Home/Environment  Lives with Children, Spouse., 05/23/2018  Tobacco  Never smoker, 02/26/2018  Family History  Diabetes mellitus: Father.  Hearing loss: Sister.  High cholesterol: Mother, Sister and Sister.  TB - Pulmonary tuberculosis: Father.  Immunizations  Vaccine Date Status   pneumococcal 13-valent conjugate vaccine 02/12/2015 Recorded   pneumococcal 13-valent conjugate vaccine 02/12/2015 Recorded   pneumococcal 23-polyvalent vaccine 02/05/2013 Recorded   zoster vaccine live 09/14/2011 Recorded    tetanus/diphth/pertuss (Tdap) adult/adol 05/16/2011 Recorded

## 2022-05-03 NOTE — HISTORICAL [facility] CERNER
This is a historical note converted from José. Formatting and pictures may have been removed.  Please reference José for original formatting and attached multimedia. Hospital Course  72M with h/o of diverticulitis admitted with 1 day history of RLQ pain.? Imaging was consistent with acute appendicitis. He was taken to the OR for a laparoscopic appendectomy the following morning.? He tolerated the procedure well.? Post operatively he was tolerating a diet with no nausea or vomiting, his pain was controlled on minimal oral medication, he was ambulating, voiding, and having bowel function.? He was deemed stable and ready for discharge  Significant Findings  acute appendicitis  Procedures and Treatment Provided  laparoscopic appendectomy  Physical Exam  Vitals & Measurements  T:?36.7? ?C (Oral)? TMIN:?36.6? ?C (Oral)? TMAX:?36.7? ?C (Oral)? HR:?77(Peripheral)? RR:?20? BP:?142/65? SpO2:?98%?  Afebrile, hypertensive at baseline  Gen: NAD, AAOx3  HEENT: NCAT, PERRLA, EOMI  CV: Normal rate, regular rhythm, no murmurs/gallops/rubs  Resp: Clear to auscultation bilaterally, no wheezes or rhonchi  Abdomen: soft, appropriately tender, nondistended, incisions c/d/i  Extremities: warm and well perfused, no clubbing or edema  ?  Discharge Plan  follow up in [facility] in 1-2 weeks  Orders:  acetaminophen-HYDROcodone, 1 tab(s), Oral, q6hr, PRN PRN as needed for pain, # 28 tab(s), 0 Refill(s)  72M with h/o diverticulitis and HTN, with acute appendicitis s/p laparoscopic appendectomy  - Transition to oral?pain control  - Reg diet  - OOB/ambulate  ?  Ivis Miller MD  [employer] [facility]?Resident, -1  Patient Discharge Condition  Good  Discharge Disposition  To home   doing well s/p lappy.? OK for discharge

## 2022-07-21 ENCOUNTER — PATIENT OUTREACH (OUTPATIENT)
Dept: ADMINISTRATIVE | Facility: HOSPITAL | Age: 77
End: 2022-07-21
Payer: OTHER GOVERNMENT

## 2022-07-21 NOTE — PROGRESS NOTES
Population Health Outreach.Records Received, hyper-linked into chart at this time. The following record(s)  below were uploaded for Health Maintenance .    8/28/2019-COLONOSCOPY

## 2022-09-08 PROBLEM — I10 PRIMARY HYPERTENSION: Status: ACTIVE | Noted: 2022-09-08

## 2022-09-08 PROBLEM — C7A.8: Status: ACTIVE | Noted: 2022-09-08

## 2022-09-08 PROBLEM — E11.9 DIABETES MELLITUS: Status: ACTIVE | Noted: 2022-09-08

## 2022-09-08 PROBLEM — R10.9 ABDOMINAL PAIN OF UNKNOWN ETIOLOGY: Status: ACTIVE | Noted: 2022-09-08

## 2022-09-08 PROBLEM — M19.90 ARTHRITIS: Status: ACTIVE | Noted: 2022-09-08

## 2022-09-08 PROBLEM — H91.90 HEARING LOSS: Status: ACTIVE | Noted: 2022-09-08

## 2022-09-08 PROBLEM — G89.29 CHRONIC BACK PAIN: Status: ACTIVE | Noted: 2022-09-08

## 2022-09-08 PROBLEM — C18.9 MALIGNANT NEOPLASM OF COLON: Status: RESOLVED | Noted: 2022-09-08 | Resolved: 2022-09-08

## 2022-09-08 PROBLEM — N40.0 BPH (BENIGN PROSTATIC HYPERPLASIA): Status: ACTIVE | Noted: 2022-09-08

## 2022-09-08 PROBLEM — E78.00 HYPERCHOLESTEROLEMIA: Status: ACTIVE | Noted: 2022-09-08

## 2022-09-08 PROBLEM — Z23 IMMUNIZATION DUE: Status: ACTIVE | Noted: 2022-09-08

## 2022-09-08 PROBLEM — C18.9 MALIGNANT NEOPLASM OF COLON: Status: ACTIVE | Noted: 2022-09-08

## 2022-09-08 PROBLEM — C43.72 MALIGNANT MELANOMA OF LEFT LATERAL THIGH: Status: ACTIVE | Noted: 2022-09-08

## 2022-09-08 PROBLEM — M54.9 CHRONIC BACK PAIN: Status: ACTIVE | Noted: 2022-09-08

## 2022-09-08 PROBLEM — K21.9 GASTROESOPHAGEAL REFLUX DISEASE: Status: ACTIVE | Noted: 2022-09-08

## 2023-03-13 PROBLEM — Z00.00 ENCOUNTER FOR WELLNESS EXAMINATION IN ADULT: Status: ACTIVE | Noted: 2023-03-13

## 2023-03-13 PROBLEM — Z12.5 PROSTATE CANCER SCREENING: Status: ACTIVE | Noted: 2023-03-13

## 2023-06-19 PROBLEM — Z00.00 ENCOUNTER FOR WELLNESS EXAMINATION IN ADULT: Status: RESOLVED | Noted: 2023-03-13 | Resolved: 2023-06-19

## 2024-02-26 ENCOUNTER — ANESTHESIA EVENT (OUTPATIENT)
Dept: CARDIOLOGY | Facility: HOSPITAL | Age: 79
End: 2024-02-26
Payer: OTHER GOVERNMENT

## 2024-02-29 ENCOUNTER — HOSPITAL ENCOUNTER (OUTPATIENT)
Facility: HOSPITAL | Age: 79
Discharge: HOME | End: 2024-02-29
Attending: INTERNAL MEDICINE | Admitting: INTERNAL MEDICINE
Payer: OTHER GOVERNMENT

## 2024-02-29 ENCOUNTER — HOSPITAL ENCOUNTER (OUTPATIENT)
Dept: CARDIOLOGY | Facility: HOSPITAL | Age: 79
Discharge: HOME | End: 2024-02-29
Attending: INTERNAL MEDICINE | Admitting: INTERNAL MEDICINE
Payer: OTHER GOVERNMENT

## 2024-02-29 ENCOUNTER — ANESTHESIA (OUTPATIENT)
Dept: CARDIOLOGY | Facility: HOSPITAL | Age: 79
End: 2024-02-29
Payer: OTHER GOVERNMENT

## 2024-02-29 DIAGNOSIS — I48.20 CHRONIC ATRIAL FIBRILLATION: ICD-10-CM

## 2024-02-29 DIAGNOSIS — I48.92 ATRIAL FLUTTER: ICD-10-CM

## 2024-02-29 DIAGNOSIS — I48.20 CHRONIC ATRIAL FIBRILLATION: Primary | ICD-10-CM

## 2024-02-29 LAB
BSA FOR ECHO PROCEDURE: 2.07 M2
POCT GLUCOSE: 91 MG/DL (ref 70–110)
POCT GLUCOSE: 96 MG/DL (ref 70–110)

## 2024-02-29 PROCEDURE — 25000003 PHARM REV CODE 250

## 2024-02-29 PROCEDURE — 37000008 HC ANESTHESIA 1ST 15 MINUTES: Performed by: INTERNAL MEDICINE

## 2024-02-29 PROCEDURE — 93320 DOPPLER ECHO COMPLETE: CPT | Mod: 26,,, | Performed by: INTERNAL MEDICINE

## 2024-02-29 PROCEDURE — 93325 DOPPLER ECHO COLOR FLOW MAPG: CPT

## 2024-02-29 PROCEDURE — D9220A PRA ANESTHESIA: Mod: ANES,,, | Performed by: ANESTHESIOLOGY

## 2024-02-29 PROCEDURE — 37000009 HC ANESTHESIA EA ADD 15 MINS: Performed by: INTERNAL MEDICINE

## 2024-02-29 PROCEDURE — C1730 CATH, EP, 19 OR FEW ELECT: HCPCS | Performed by: INTERNAL MEDICINE

## 2024-02-29 PROCEDURE — C1731 CATH, EP, 20 OR MORE ELEC: HCPCS | Performed by: INTERNAL MEDICINE

## 2024-02-29 PROCEDURE — C1894 INTRO/SHEATH, NON-LASER: HCPCS | Performed by: INTERNAL MEDICINE

## 2024-02-29 PROCEDURE — 93653 COMPRE EP EVAL TX SVT: CPT | Performed by: INTERNAL MEDICINE

## 2024-02-29 PROCEDURE — 25000003 PHARM REV CODE 250: Performed by: INTERNAL MEDICINE

## 2024-02-29 PROCEDURE — D9220A PRA ANESTHESIA: Mod: CRNA,,,

## 2024-02-29 PROCEDURE — 93005 ELECTROCARDIOGRAM TRACING: CPT

## 2024-02-29 PROCEDURE — 93325 DOPPLER ECHO COLOR FLOW MAPG: CPT | Mod: 26,,, | Performed by: INTERNAL MEDICINE

## 2024-02-29 PROCEDURE — C1732 CATH, EP, DIAG/ABL, 3D/VECT: HCPCS | Performed by: INTERNAL MEDICINE

## 2024-02-29 PROCEDURE — 93312 ECHO TRANSESOPHAGEAL: CPT | Mod: 26,,, | Performed by: INTERNAL MEDICINE

## 2024-02-29 PROCEDURE — 63600175 PHARM REV CODE 636 W HCPCS: Performed by: INTERNAL MEDICINE

## 2024-02-29 PROCEDURE — 27201423 OPTIME MED/SURG SUP & DEVICES STERILE SUPPLY: Performed by: INTERNAL MEDICINE

## 2024-02-29 PROCEDURE — 63600175 PHARM REV CODE 636 W HCPCS

## 2024-02-29 PROCEDURE — 93010 ELECTROCARDIOGRAM REPORT: CPT | Mod: ,,, | Performed by: INTERNAL MEDICINE

## 2024-02-29 RX ORDER — MORPHINE SULFATE 4 MG/ML
2 INJECTION, SOLUTION INTRAMUSCULAR; INTRAVENOUS EVERY 4 HOURS PRN
Status: DISCONTINUED | OUTPATIENT
Start: 2024-02-29 | End: 2024-02-29 | Stop reason: HOSPADM

## 2024-02-29 RX ORDER — DEXAMETHASONE SODIUM PHOSPHATE 4 MG/ML
INJECTION, SOLUTION INTRA-ARTICULAR; INTRALESIONAL; INTRAMUSCULAR; INTRAVENOUS; SOFT TISSUE
Status: DISCONTINUED | OUTPATIENT
Start: 2024-02-29 | End: 2024-02-29

## 2024-02-29 RX ORDER — HYDROMORPHONE HYDROCHLORIDE 2 MG/ML
0.2 INJECTION, SOLUTION INTRAMUSCULAR; INTRAVENOUS; SUBCUTANEOUS EVERY 5 MIN PRN
Status: DISCONTINUED | OUTPATIENT
Start: 2024-02-29 | End: 2024-02-29 | Stop reason: HOSPADM

## 2024-02-29 RX ORDER — PROPOFOL 10 MG/ML
VIAL (ML) INTRAVENOUS
Status: DISCONTINUED | OUTPATIENT
Start: 2024-02-29 | End: 2024-02-29

## 2024-02-29 RX ORDER — PHENYLEPHRINE HYDROCHLORIDE 10 MG/ML
INJECTION INTRAVENOUS
Status: DISCONTINUED | OUTPATIENT
Start: 2024-02-29 | End: 2024-02-29

## 2024-02-29 RX ORDER — ENOXAPARIN SODIUM 150 MG/ML
1 INJECTION SUBCUTANEOUS 2 TIMES DAILY
Status: ON HOLD | COMMUNITY
Start: 2024-02-28 | End: 2024-02-29 | Stop reason: HOSPADM

## 2024-02-29 RX ORDER — ADENOSINE 3 MG/ML
INJECTION, SOLUTION INTRAVENOUS
Status: DISCONTINUED | OUTPATIENT
Start: 2024-02-29 | End: 2024-02-29 | Stop reason: HOSPADM

## 2024-02-29 RX ORDER — ACETAMINOPHEN 325 MG/1
650 TABLET ORAL EVERY 4 HOURS PRN
Status: DISCONTINUED | OUTPATIENT
Start: 2024-02-29 | End: 2024-02-29 | Stop reason: HOSPADM

## 2024-02-29 RX ORDER — HYDROCODONE BITARTRATE AND ACETAMINOPHEN 5; 325 MG/1; MG/1
1 TABLET ORAL EVERY 4 HOURS PRN
Status: DISCONTINUED | OUTPATIENT
Start: 2024-02-29 | End: 2024-02-29 | Stop reason: HOSPADM

## 2024-02-29 RX ORDER — ROCURONIUM BROMIDE 10 MG/ML
INJECTION, SOLUTION INTRAVENOUS
Status: DISCONTINUED | OUTPATIENT
Start: 2024-02-29 | End: 2024-02-29

## 2024-02-29 RX ORDER — SODIUM CHLORIDE 0.9 % (FLUSH) 0.9 %
10 SYRINGE (ML) INJECTION
Status: DISCONTINUED | OUTPATIENT
Start: 2024-02-29 | End: 2024-02-29 | Stop reason: HOSPADM

## 2024-02-29 RX ORDER — ONDANSETRON HYDROCHLORIDE 2 MG/ML
4 INJECTION, SOLUTION INTRAVENOUS DAILY PRN
Status: DISCONTINUED | OUTPATIENT
Start: 2024-02-29 | End: 2024-02-29 | Stop reason: HOSPADM

## 2024-02-29 RX ORDER — EPHEDRINE SULFATE 50 MG/ML
INJECTION, SOLUTION INTRAVENOUS
Status: DISCONTINUED | OUTPATIENT
Start: 2024-02-29 | End: 2024-02-29

## 2024-02-29 RX ORDER — SODIUM CHLORIDE 9 MG/ML
INJECTION, SOLUTION INTRAVENOUS ONCE
Status: DISCONTINUED | OUTPATIENT
Start: 2024-02-29 | End: 2024-02-29 | Stop reason: HOSPADM

## 2024-02-29 RX ORDER — LIDOCAINE HYDROCHLORIDE 20 MG/ML
INJECTION INTRAVENOUS
Status: DISCONTINUED | OUTPATIENT
Start: 2024-02-29 | End: 2024-02-29

## 2024-02-29 RX ORDER — ONDANSETRON HYDROCHLORIDE 2 MG/ML
INJECTION, SOLUTION INTRAVENOUS
Status: DISCONTINUED | OUTPATIENT
Start: 2024-02-29 | End: 2024-02-29

## 2024-02-29 RX ORDER — FENTANYL CITRATE 50 UG/ML
INJECTION, SOLUTION INTRAMUSCULAR; INTRAVENOUS
Status: DISCONTINUED | OUTPATIENT
Start: 2024-02-29 | End: 2024-02-29

## 2024-02-29 RX ORDER — GLYCOPYRROLATE 0.2 MG/ML
INJECTION INTRAMUSCULAR; INTRAVENOUS
Status: DISCONTINUED | OUTPATIENT
Start: 2024-02-29 | End: 2024-02-29

## 2024-02-29 RX ORDER — LIDOCAINE HYDROCHLORIDE 10 MG/ML
INJECTION INFILTRATION; PERINEURAL
Status: DISCONTINUED | OUTPATIENT
Start: 2024-02-29 | End: 2024-02-29 | Stop reason: HOSPADM

## 2024-02-29 RX ADMIN — EPHEDRINE SULFATE 10 MG: 50 INJECTION INTRAVENOUS at 09:02

## 2024-02-29 RX ADMIN — SODIUM CHLORIDE, SODIUM GLUCONATE, SODIUM ACETATE, POTASSIUM CHLORIDE AND MAGNESIUM CHLORIDE: 526; 502; 368; 37; 30 INJECTION, SOLUTION INTRAVENOUS at 07:02

## 2024-02-29 RX ADMIN — PROPOFOL 140 MG: 10 INJECTION, EMULSION INTRAVENOUS at 07:02

## 2024-02-29 RX ADMIN — PHENYLEPHRINE HYDROCHLORIDE 100 MCG: 10 INJECTION INTRAVENOUS at 08:02

## 2024-02-29 RX ADMIN — GLYCOPYRROLATE 0.2 MG: 0.2 INJECTION INTRAMUSCULAR; INTRAVENOUS at 09:02

## 2024-02-29 RX ADMIN — DEXAMETHASONE SODIUM PHOSPHATE 4 MG: 4 INJECTION, SOLUTION INTRA-ARTICULAR; INTRALESIONAL; INTRAMUSCULAR; INTRAVENOUS; SOFT TISSUE at 08:02

## 2024-02-29 RX ADMIN — FENTANYL CITRATE 100 MCG: 50 INJECTION, SOLUTION INTRAMUSCULAR; INTRAVENOUS at 07:02

## 2024-02-29 RX ADMIN — LIDOCAINE HYDROCHLORIDE 80 MG: 20 INJECTION INTRAVENOUS at 07:02

## 2024-02-29 RX ADMIN — PHENYLEPHRINE HYDROCHLORIDE 100 MCG: 10 INJECTION INTRAVENOUS at 09:02

## 2024-02-29 RX ADMIN — SUGAMMADEX 200 MG: 100 INJECTION, SOLUTION INTRAVENOUS at 09:02

## 2024-02-29 RX ADMIN — ONDANSETRON 4 MG: 2 INJECTION INTRAMUSCULAR; INTRAVENOUS at 09:02

## 2024-02-29 RX ADMIN — PHENYLEPHRINE HYDROCHLORIDE 20 MCG/MIN: 10 INJECTION INTRAVENOUS at 08:02

## 2024-02-29 RX ADMIN — ROCURONIUM BROMIDE 50 MG: 10 SOLUTION INTRAVENOUS at 07:02

## 2024-02-29 NOTE — ANESTHESIA PROCEDURE NOTES
Intubation    Date/Time: 2/29/2024 8:00 AM    Performed by: Natasha White CRNA  Authorized by: Alfreda Villegas MD    Intubation:     Induction:  Intravenous    Intubated:  Postinduction    Mask Ventilation:  Easy mask    Attempts:  1    Attempted By:  CRNA    Method of Intubation:  Direct    Blade:  Aleah 3    Laryngeal View Grade: Grade I - full view of cords      Difficult Airway Encountered?: No      Complications:  None    Airway Device:  Oral endotracheal tube    Airway Device Size:  7.5    Style/Cuff Inflation:  Cuffed (inflated to minimal occlusive pressure)    Tube secured:  23    Secured at:  The lips    Placement Verified By:  Capnometry    Complicating Factors:  None    Findings Post-Intubation:  BS equal bilateral and atraumatic/condition of teeth unchanged

## 2024-02-29 NOTE — ANESTHESIA PREPROCEDURE EVALUATION
02/29/2024  Aubrey Joseph Milligan is a 78 y.o., male.    Presenting for AFib ablation.  Patient has a history of hypertension, cardiovascular disease, diabetes, of obstructive sleep apnea on CPAP.  Currently on Xarelto echo revealing EF 45%, previously measured at 70%.  Stress test negative for reversible ischemic changes.      Pre-op Assessment    I have reviewed the Patient Summary Reports.     I have reviewed the Nursing Notes. I have reviewed the NPO Status.   I have reviewed the Medications.     Review of Systems  Cardiovascular:     Hypertension                                  Hypertension         Hepatic/GI:     GERD      Gerd          Neurological:    Neuromuscular Disease,                                 Neuromuscular Disease   Endocrine:  Diabetes    Diabetes                          Physical Exam  General: Well nourished, Cooperative, Alert and Oriented    Airway:  Mallampati: II   Mouth Opening: Normal  TM Distance: Normal  Tongue: Normal  Neck ROM: Normal ROM    Dental:  Intact        Anesthesia Plan  Type of Anesthesia, risks & benefits discussed:    Anesthesia Type: Gen ETT  Intra-op Monitoring Plan: Standard ASA Monitors and Art Line  Post Op Pain Control Plan: multimodal analgesia and IV/PO Opioids PRN  Airway Plan: Direct, Post-Induction  Informed Consent: Informed consent signed with the Patient and all parties understand the risks and agree with anesthesia plan.  All questions answered. Patient consented to blood products? Yes  ASA Score: 3  Day of Surgery Review of History & Physical: H&P Update referred to the surgeon/provider.    Ready For Surgery From Anesthesia Perspective.     .

## 2024-02-29 NOTE — TRANSFER OF CARE
"Anesthesia Transfer of Care Note    Patient: Aubrey Joseph Milligan    Procedure(s) Performed: Procedure(s) (LRB):  Ablation (N/A)  Transesophageal echo (MAGGIE) intra-procedure log documentation (N/A)    Patient location: PACU    Anesthesia Type: general    Transport from OR: Transported from OR on room air with adequate spontaneous ventilation. Continuous SpO2 monitoring in transport    Post pain: adequate analgesia    Post assessment: no apparent anesthetic complications and tolerated procedure well    Post vital signs: stable    Level of consciousness: awake, alert and oriented    Nausea/Vomiting: no nausea/vomiting    Complications: none    Transfer of care protocol was followed    Last vitals: Visit Vitals  BP (!) 148/76   Pulse 75   Temp 36.5 °C (97.7 °F) (Oral)   Ht 5' 9" (1.753 m)   Wt 88.2 kg (194 lb 7.1 oz)   SpO2 99%   BMI 28.71 kg/m²     "

## 2024-02-29 NOTE — PLAN OF CARE
Patient ambulated in hallway. Site reassessed, WDL: no hematoma. Hemostasis achieved; Dressing clean, dry, and intact. Discharge instructions given to patient and wife; both verbalize understanding. Both Ivs removed and patient brought to wife vehicle via wheelchair. Patient in no acute distress upon discharge.

## 2024-02-29 NOTE — Clinical Note
A dressing was applied to the right femoral vein puncture site. All three right femoral venous access sites covered with sterile gauze and tegaderm.

## 2024-02-29 NOTE — DISCHARGE INSTRUCTIONS
Resume Xarelto tonight.   No driving for 48 hours.   Remove dressing in 24 hours.   You many shower with antibacterial soap and water only. Do not apply ointments, lotions, powders, etc to site.   NO tub bathing or swimming for 5 days.   Keep site clean and dry.  No lifting, pushing, or pulling anything heavier than 10 pounds (a gallon of milk) for 5 days.   Monitor for bleeding and/ or any signs of infection (fever, discharge, redness, pain, etc) at site.   Report to ER for any signs of infection.

## 2024-02-29 NOTE — DISCHARGE SUMMARY
[facility] - Cath Lab Services  Discharge Note  Short Stay    Procedure(s) (LRB):  Ablation (N/A)  Transesophageal echo (MAGGIE) intra-procedure log documentation (N/A)      OUTCOME: Patient tolerated treatment/procedure well without complication and is now ready for discharge.    DISPOSITION: Home or Self Care    FINAL DIAGNOSIS:  Atrial flutter    FOLLOWUP: In clinic    DISCHARGE INSTRUCTIONS:  No discharge procedures on file.     TIME SPENT ON DISCHARGE: 15 minutes

## 2024-03-01 LAB
OHS QRS DURATION: 80 MS
OHS QRS DURATION: 88 MS
OHS QTC CALCULATION: 425 MS
OHS QTC CALCULATION: 444 MS

## 2024-03-01 NOTE — ANESTHESIA POSTPROCEDURE EVALUATION
Anesthesia Post Evaluation    Patient: Aubrey Joseph Milligan    Procedure(s) Performed: Procedure(s) (LRB):  Ablation (N/A)  Transesophageal echo (MAGGIE) intra-procedure log documentation (N/A)    Final Anesthesia Type: general      Patient location during evaluation: PACU  Patient participation: Yes- Able to Participate  Level of consciousness: awake and alert  Post-procedure vital signs: reviewed and stable  Pain management: adequate  Airway patency: patent      Anesthetic complications: no      Cardiovascular status: hemodynamically stable  Respiratory status: unassisted  Hydration status: euvolemic  Follow-up not needed.              Vitals Value Taken Time   /80 02/29/24 1146   Temp 36.6 °C (97.8 °F) 02/29/24 1020   Pulse 89 02/29/24 1152   Resp 12 02/29/24 1151   SpO2 99 % 02/29/24 1152   Vitals shown include unvalidated device data.      No case tracking events are documented in the log.      Pain/Anamaria Score: Anamaria Score: 10 (2/29/2024 10:37 AM)

## 2024-03-05 VITALS
WEIGHT: 194.44 LBS | DIASTOLIC BLOOD PRESSURE: 80 MMHG | HEART RATE: 88 BPM | RESPIRATION RATE: 18 BRPM | HEIGHT: 69 IN | BODY MASS INDEX: 28.8 KG/M2 | OXYGEN SATURATION: 98 % | TEMPERATURE: 98 F | SYSTOLIC BLOOD PRESSURE: 151 MMHG

## 2024-03-18 PROBLEM — I48.11 LONGSTANDING PERSISTENT ATRIAL FIBRILLATION: Status: ACTIVE | Noted: 2024-03-18

## 2024-06-17 PROBLEM — Z00.00 ENCOUNTER FOR WELLNESS EXAMINATION IN ADULT: Status: RESOLVED | Noted: 2023-03-13 | Resolved: 2024-06-17

## (undated) DEVICE — DRESSING TRANS 4X4 TEGADERM

## (undated) DEVICE — PAD DEFIB RADIOTRANSPARENT

## (undated) DEVICE — KIT ENSITE ELECTRODE SURFACE

## (undated) DEVICE — INTRO 8.5FR 63CM SRO

## (undated) DEVICE — Device

## (undated) DEVICE — SET TUBING COOL POINT IRR

## (undated) DEVICE — CATH DUO DECAPOLAR 7FRX95CM

## (undated) DEVICE — CATH FLEXABILITY F-J 8FR 115CM

## (undated) DEVICE — ELECTRODE REM PLYHSV RETURN 9

## (undated) DEVICE — CATH SUPREME QPLR CRD-2 6F 120

## (undated) DEVICE — INTRODUCER SHEATH 7F 11CM 35MM

## (undated) DEVICE — SHEATH BRITE TIP INTRO 11CM 9F

## (undated) DEVICE — CATH DECAPOLAR 6FRX110CM